# Patient Record
Sex: FEMALE | Race: WHITE | NOT HISPANIC OR LATINO | Employment: UNEMPLOYED | URBAN - METROPOLITAN AREA
[De-identification: names, ages, dates, MRNs, and addresses within clinical notes are randomized per-mention and may not be internally consistent; named-entity substitution may affect disease eponyms.]

---

## 2023-02-23 PROBLEM — E88.819 INSULIN RESISTANCE: Status: ACTIVE | Noted: 2023-02-23

## 2023-02-23 PROBLEM — B85.0 HEAD LICE: Status: ACTIVE | Noted: 2023-02-23

## 2023-02-23 PROBLEM — J30.9 ALLERGIC RHINITIS: Status: ACTIVE | Noted: 2023-02-23

## 2023-02-23 PROBLEM — F32.A DEPRESSION: Status: ACTIVE | Noted: 2023-02-23

## 2023-02-23 PROBLEM — E78.1 HIGH TRIGLYCERIDES: Status: ACTIVE | Noted: 2023-02-23

## 2023-02-23 PROBLEM — F41.1 GENERALIZED ANXIETY DISORDER: Status: ACTIVE | Noted: 2023-02-23

## 2023-02-23 PROBLEM — E55.9 VITAMIN D DEFICIENCY: Status: ACTIVE | Noted: 2023-02-23

## 2023-02-23 RX ORDER — KETOCONAZOLE 20 MG/ML
SHAMPOO, SUSPENSION TOPICAL
COMMUNITY
Start: 2021-10-26

## 2023-02-23 RX ORDER — CETIRIZINE HYDROCHLORIDE 10 MG/1
1 TABLET ORAL NIGHTLY
COMMUNITY
Start: 2020-02-06 | End: 2023-06-13 | Stop reason: SDUPTHER

## 2023-02-23 RX ORDER — BENZOYL PEROXIDE 50 MG/ML
LIQUID TOPICAL
COMMUNITY
Start: 2020-09-15 | End: 2023-03-13 | Stop reason: ALTCHOICE

## 2023-02-23 RX ORDER — ADAPALENE 1 MG/G
GEL TOPICAL
COMMUNITY
Start: 2020-02-06 | End: 2023-06-13 | Stop reason: ALTCHOICE

## 2023-02-23 RX ORDER — SPIRONOLACTONE 50 MG/1
1 TABLET, FILM COATED ORAL DAILY
COMMUNITY
Start: 2022-05-04

## 2023-02-23 RX ORDER — FLUOROURACIL 50 MG/G
CREAM TOPICAL
COMMUNITY
Start: 2020-11-10

## 2023-02-23 RX ORDER — TAZAROTENE 0.45 MG/G
LOTION TOPICAL
COMMUNITY
Start: 2022-05-04

## 2023-02-23 RX ORDER — PERMETHRIN 0.25 %
SPRAY, NON-AEROSOL (ML) MISCELLANEOUS
COMMUNITY
Start: 2022-04-26 | End: 2023-03-13 | Stop reason: ALTCHOICE

## 2023-02-23 RX ORDER — DROSPIRENONE AND ETHINYL ESTRADIOL 0.02-3(28)
1 KIT ORAL DAILY
COMMUNITY
Start: 2022-05-04

## 2023-02-23 RX ORDER — FENOFIBRATE 145 MG/1
1 TABLET, FILM COATED ORAL DAILY
COMMUNITY
Start: 2022-01-13 | End: 2023-03-13 | Stop reason: ALTCHOICE

## 2023-02-23 RX ORDER — MONTELUKAST SODIUM 10 MG/1
1 TABLET ORAL DAILY
COMMUNITY
Start: 2020-02-06 | End: 2023-05-15

## 2023-02-23 RX ORDER — METFORMIN HYDROCHLORIDE 500 MG/1
250 TABLET ORAL
COMMUNITY
Start: 2020-02-06 | End: 2023-03-13 | Stop reason: SINTOL

## 2023-02-23 RX ORDER — VENLAFAXINE HYDROCHLORIDE 150 MG/1
150 CAPSULE, EXTENDED RELEASE ORAL DAILY
COMMUNITY
End: 2023-05-15

## 2023-03-12 PROBLEM — B85.0 HEAD LICE: Status: RESOLVED | Noted: 2023-02-23 | Resolved: 2023-03-12

## 2023-03-12 NOTE — PROGRESS NOTES
Subjective   Patient ID: Debo Bradley is a 22 y.o. female who presents for visit to establish care and follow up of routine labs.    Reports feeling of hot sweat, chills, vomiting, happening any time of the day.  One time it happened 3 hours after eating but was very nauseated. Mother with DM2. These symptoms have been going on for the past few months.  Has had insulin resistance for the past 5-6 years.  Hasn't reportedly gotten worse.     Diet: Doesn't eat seafood, tries to avoid sugary foods, only about once per day. Using sweetened creamer.  Good amount of fruits and veggies.   Exercise: No regular exercise  Weight: Stable  Water: Drinking at least 1-2 glasses per day  Sleep: Varying sleep, sometimes taking 12 mg melatonin to sleep other times even with melatonin cannot get to sleep. More frequent over the past few months. Getting about 9 hours sleep per night  Social: Single, lives with her mom and sister in a two floor home. Cats, guinea pig, avel.   Professional: Recently in school, babysitting.  BA graduate in criminal justice or sociology.    Review of Systems   Constitutional: Negative.    HENT: Negative.     Eyes: Negative.    Respiratory: Negative.     Cardiovascular: Negative.    Gastrointestinal: Negative.    Endocrine: Negative.    Genitourinary: Negative.    Musculoskeletal: Negative.    Skin: Negative.    Neurological: Negative.    Hematological: Negative.    Psychiatric/Behavioral: Negative.          Current Outpatient Medications   Medication Sig Dispense Refill    adapalene (Differin) 0.1 % gel Differin 0.1 % External Gel   Refills: 0        Start : 6-Feb-2020   Active  15 GM Tube      cetirizine (ZyrTEC) 10 mg tablet Take 1 tablet (10 mg) by mouth once daily at bedtime.      drospirenone-ethinyl estradioL (Karo, Gianvi) 3-0.02 mg tablet Take 1 tablet by mouth once daily.      fluorouracil (Efudex) 5 % cream APPLY TOPICALLY TO THE AFFECTED AREA TWICE DAILY THEN COVER WITH A BANDAID       ketoconazole (NIZOral) 2 % shampoo APPLY TO SCALP IN SHOWER LET SIT FOR 5 MINUTES THEN RINSE ONCE A DAY      montelukast (Singulair) 10 mg tablet Take 1 tablet (10 mg) by mouth once daily.      spironolactone (Aldactone) 50 mg tablet Take 1 tablet (50 mg) by mouth once daily.      tazarotene (Arazlo) 0.045 % lotion APPLY TO FACE 2 TO 3 NIGHTS PER WEEK INCREASING TO NIGHTLY AS TOLERATED      venlafaxine XR (Effexor-XR) 150 mg 24 hr capsule Take 1 capsule (150 mg) by mouth once daily. Do not crush or chew.       No current facility-administered medications for this visit.     Past Surgical History:   Procedure Laterality Date    OTHER SURGICAL HISTORY  07/31/2020    Tonsillectomy with adenoidectomy    OTHER SURGICAL HISTORY  07/31/2020    Foot surgery    OTHER SURGICAL HISTORY  07/31/2020    Ear surgery     Family History   Problem Relation Name Age of Onset    Diabetes Mother      Other (abnormal pap) Mother      Diabetes Maternal Grandmother      Hypertension Maternal Grandmother      Depression Maternal Grandmother      Hyperlipidemia Maternal Grandmother      Other (vertigo) Maternal Grandmother      Dementia Other      Psoriasis Other      Hyperlipidemia Other        Social History     Tobacco Use    Smoking status: Never    Smokeless tobacco: Never   Vaping Use    Vaping status: Never Used     Passive vaping exposure: Yes   Substance Use Topics    Alcohol use: Yes     Comment: once in a blue moon    Drug use: Yes     Types: Marijuana        Objective     Visit Vitals  /86 (BP Location: Left arm, Patient Position: Sitting, BP Cuff Size: Adult)   Pulse 76   Temp 36 °C (96.8 °F) (Temporal)   Resp 14   Ht 1.524 m (5')   Wt 70.8 kg (156 lb)   SpO2 99%   BMI 30.47 kg/m²   Smoking Status Never   BSA 1.73 m²        Physical Exam  HENT:      Head: Normocephalic and atraumatic.      Right Ear: Tympanic membrane and ear canal normal.      Left Ear: Tympanic membrane and ear canal normal.      Nose: Nose normal. No  congestion or rhinorrhea.   Eyes:      Extraocular Movements: Extraocular movements intact.      Conjunctiva/sclera: Conjunctivae normal.      Pupils: Pupils are equal, round, and reactive to light.   Neck:      Vascular: No carotid bruit.   Cardiovascular:      Rate and Rhythm: Normal rate and regular rhythm.      Pulses: Normal pulses.      Heart sounds: Normal heart sounds.   Pulmonary:      Effort: Pulmonary effort is normal.      Breath sounds: Normal breath sounds.   Abdominal:      General: Bowel sounds are normal.      Palpations: Abdomen is soft.   Musculoskeletal:         General: Normal range of motion.      Cervical back: Normal range of motion.   Skin:     General: Skin is warm and dry.   Neurological:      General: No focal deficit present.      Mental Status: She is alert and oriented to person, place, and time.   Psychiatric:         Mood and Affect: Mood normal.         Behavior: Behavior normal.           Assessment/Plan   Problem List Items Addressed This Visit       High triglycerides     Discontinue fenofibrate  Repeat fasting lipid panel today and in 3 months  Advised to quit using coffee substitute creamer          Insulin resistance     Reporting nausea, vomiting ?s/t metformin?  Discontinue metformin, check A1c today and in 3 months          Insomnia - Primary     Trazodone 100mg as needed at bedtime  Melatonin 10mg as needed at bedtime            All pertinent lab work and results were reviewed with patient.     Follow up 3 months or sooner as needed    MELI Rico-CNS

## 2023-03-13 ENCOUNTER — OFFICE VISIT (OUTPATIENT)
Dept: PRIMARY CARE | Facility: CLINIC | Age: 23
End: 2023-03-13
Payer: MEDICAID

## 2023-03-13 ENCOUNTER — LAB (OUTPATIENT)
Dept: LAB | Facility: LAB | Age: 23
End: 2023-03-13
Payer: MEDICAID

## 2023-03-13 VITALS
OXYGEN SATURATION: 99 % | BODY MASS INDEX: 30.63 KG/M2 | RESPIRATION RATE: 14 BRPM | HEART RATE: 76 BPM | WEIGHT: 156 LBS | DIASTOLIC BLOOD PRESSURE: 86 MMHG | SYSTOLIC BLOOD PRESSURE: 113 MMHG | HEIGHT: 60 IN | TEMPERATURE: 96.8 F

## 2023-03-13 DIAGNOSIS — E88.819 INSULIN RESISTANCE: ICD-10-CM

## 2023-03-13 DIAGNOSIS — Z11.4 SCREENING FOR HIV (HUMAN IMMUNODEFICIENCY VIRUS): ICD-10-CM

## 2023-03-13 DIAGNOSIS — E55.9 VITAMIN D DEFICIENCY: ICD-10-CM

## 2023-03-13 DIAGNOSIS — E78.1 HIGH TRIGLYCERIDES: ICD-10-CM

## 2023-03-13 DIAGNOSIS — G47.00 INSOMNIA, UNSPECIFIED TYPE: Primary | ICD-10-CM

## 2023-03-13 DIAGNOSIS — Z11.59 ENCOUNTER FOR HEPATITIS C SCREENING TEST FOR LOW RISK PATIENT: ICD-10-CM

## 2023-03-13 LAB
CALCIDIOL (25 OH VITAMIN D3) (NG/ML) IN SER/PLAS: 42 NG/ML
CHOLESTEROL (MG/DL) IN SER/PLAS: 177 MG/DL (ref 0–199)
CHOLESTEROL IN HDL (MG/DL) IN SER/PLAS: 68.6 MG/DL
CHOLESTEROL/HDL RATIO: 2.6
ERYTHROCYTE DISTRIBUTION WIDTH (RATIO) BY AUTOMATED COUNT: 12.7 % (ref 11.5–14.5)
ERYTHROCYTE MEAN CORPUSCULAR HEMOGLOBIN CONCENTRATION (G/DL) BY AUTOMATED: 33 G/DL (ref 32–36)
ERYTHROCYTE MEAN CORPUSCULAR VOLUME (FL) BY AUTOMATED COUNT: 90 FL (ref 80–100)
ERYTHROCYTES (10*6/UL) IN BLOOD BY AUTOMATED COUNT: 4.5 X10E12/L (ref 4–5.2)
ESTIMATED AVERAGE GLUCOSE FOR HBA1C: 103 MG/DL
HEMATOCRIT (%) IN BLOOD BY AUTOMATED COUNT: 40.3 % (ref 36–46)
HEMOGLOBIN (G/DL) IN BLOOD: 13.3 G/DL (ref 12–16)
HEMOGLOBIN A1C/HEMOGLOBIN TOTAL IN BLOOD: 5.2 %
HEPATITIS C VIRUS AB PRESENCE IN SERUM: NONREACTIVE
LDL: 86 MG/DL (ref 0–119)
LEUKOCYTES (10*3/UL) IN BLOOD BY AUTOMATED COUNT: 5.1 X10E9/L (ref 4.4–11.3)
PLATELETS (10*3/UL) IN BLOOD AUTOMATED COUNT: 367 X10E9/L (ref 150–450)
TRIGLYCERIDE (MG/DL) IN SER/PLAS: 113 MG/DL (ref 0–149)
VLDL: 23 MG/DL (ref 0–40)

## 2023-03-13 PROCEDURE — 82306 VITAMIN D 25 HYDROXY: CPT

## 2023-03-13 PROCEDURE — 80061 LIPID PANEL: CPT

## 2023-03-13 PROCEDURE — 99214 OFFICE O/P EST MOD 30 MIN: CPT

## 2023-03-13 PROCEDURE — 85027 COMPLETE CBC AUTOMATED: CPT

## 2023-03-13 PROCEDURE — 83036 HEMOGLOBIN GLYCOSYLATED A1C: CPT

## 2023-03-13 PROCEDURE — 86803 HEPATITIS C AB TEST: CPT

## 2023-03-13 PROCEDURE — 87389 HIV-1 AG W/HIV-1&-2 AB AG IA: CPT

## 2023-03-13 PROCEDURE — 36415 COLL VENOUS BLD VENIPUNCTURE: CPT

## 2023-03-13 PROCEDURE — 1036F TOBACCO NON-USER: CPT

## 2023-03-13 RX ORDER — TRAZODONE HYDROCHLORIDE 100 MG/1
100 TABLET ORAL NIGHTLY PRN
Qty: 30 TABLET | Refills: 0 | Status: SHIPPED | OUTPATIENT
Start: 2023-03-13 | End: 2023-12-13 | Stop reason: SDUPTHER

## 2023-03-13 SDOH — ECONOMIC STABILITY: FOOD INSECURITY: WITHIN THE PAST 12 MONTHS, YOU WORRIED THAT YOUR FOOD WOULD RUN OUT BEFORE YOU GOT MONEY TO BUY MORE.: NEVER TRUE

## 2023-03-13 SDOH — ECONOMIC STABILITY: FOOD INSECURITY: WITHIN THE PAST 12 MONTHS, THE FOOD YOU BOUGHT JUST DIDN'T LAST AND YOU DIDN'T HAVE MONEY TO GET MORE.: NEVER TRUE

## 2023-03-13 ASSESSMENT — ENCOUNTER SYMPTOMS
NEUROLOGICAL NEGATIVE: 1
PSYCHIATRIC NEGATIVE: 1
ENDOCRINE NEGATIVE: 1
RESPIRATORY NEGATIVE: 1
GASTROINTESTINAL NEGATIVE: 1
EYES NEGATIVE: 1
HEMATOLOGIC/LYMPHATIC NEGATIVE: 1
CONSTITUTIONAL NEGATIVE: 1
MUSCULOSKELETAL NEGATIVE: 1
CARDIOVASCULAR NEGATIVE: 1

## 2023-03-13 ASSESSMENT — LIFESTYLE VARIABLES
HOW MANY STANDARD DRINKS CONTAINING ALCOHOL DO YOU HAVE ON A TYPICAL DAY: 1 OR 2
HOW OFTEN DO YOU HAVE SIX OR MORE DRINKS ON ONE OCCASION: NEVER
SKIP TO QUESTIONS 9-10: 1
AUDIT-C TOTAL SCORE: 1
HOW OFTEN DO YOU HAVE A DRINK CONTAINING ALCOHOL: MONTHLY OR LESS

## 2023-03-13 ASSESSMENT — PATIENT HEALTH QUESTIONNAIRE - PHQ9
1. LITTLE INTEREST OR PLEASURE IN DOING THINGS: NOT AT ALL
SUM OF ALL RESPONSES TO PHQ9 QUESTIONS 1 & 2: 0
2. FEELING DOWN, DEPRESSED OR HOPELESS: NOT AT ALL

## 2023-03-13 ASSESSMENT — PAIN SCALES - GENERAL: PAINLEVEL: 0-NO PAIN

## 2023-03-13 NOTE — ASSESSMENT & PLAN NOTE
Reporting nausea, vomiting ?s/t metformin?  Discontinue metformin, check A1c today and in 3 months

## 2023-03-13 NOTE — ASSESSMENT & PLAN NOTE
Discontinue fenofibrate  Repeat fasting lipid panel today and in 3 months  Advised to quit using coffee substitute creamer

## 2023-03-13 NOTE — PATIENT INSTRUCTIONS
Thank you for coming to see me today.  If you have any questions or concerns following our visit, please contact the office.  Phone: [phone]    Follow up with me in 3 months or sooner as needed    1)  Increase water intake- goal 6-8 glasses per day    2) Increase exercise- try to walk 20 minutes 2-3 times per day.  AHA recommends 150 minutes of moderate intensity exercise per week    3) Stop coffee mate creamer    4) Get fasting labs today    5) STOP metformin and fenofibrate

## 2023-03-14 LAB — HIV 1/ 2 AG/AB SCREEN: NONREACTIVE

## 2023-03-17 ENCOUNTER — TELEPHONE (OUTPATIENT)
Dept: PRIMARY CARE | Facility: CLINIC | Age: 23
End: 2023-03-17
Payer: MEDICAID

## 2023-05-03 DIAGNOSIS — E78.1 HIGH TRIGLYCERIDES: Primary | ICD-10-CM

## 2023-05-03 RX ORDER — FENOFIBRATE 145 MG/1
TABLET, FILM COATED ORAL
Qty: 30 TABLET | Refills: 1 | Status: SHIPPED | OUTPATIENT
Start: 2023-05-03 | End: 2023-06-13 | Stop reason: ALTCHOICE

## 2023-05-03 RX ORDER — FENOFIBRATE 145 MG/1
145 TABLET, FILM COATED ORAL DAILY
COMMUNITY
Start: 2023-04-07 | End: 2023-05-03 | Stop reason: SDUPTHER

## 2023-05-03 RX ORDER — METFORMIN HYDROCHLORIDE 500 MG/1
250 TABLET ORAL 2 TIMES DAILY
COMMUNITY
Start: 2023-04-07 | End: 2023-06-13 | Stop reason: ALTCHOICE

## 2023-05-10 DIAGNOSIS — J30.9 ALLERGIC RHINITIS, UNSPECIFIED SEASONALITY, UNSPECIFIED TRIGGER: ICD-10-CM

## 2023-05-10 DIAGNOSIS — F41.1 GENERALIZED ANXIETY DISORDER: Primary | ICD-10-CM

## 2023-05-15 RX ORDER — VENLAFAXINE HYDROCHLORIDE 150 MG/1
CAPSULE, EXTENDED RELEASE ORAL
Qty: 30 CAPSULE | Refills: 11 | Status: SHIPPED | OUTPATIENT
Start: 2023-05-15 | End: 2023-06-13 | Stop reason: SDUPTHER

## 2023-05-15 RX ORDER — MONTELUKAST SODIUM 10 MG/1
TABLET ORAL
Qty: 30 TABLET | Refills: 11 | Status: SHIPPED | OUTPATIENT
Start: 2023-05-15 | End: 2023-06-13 | Stop reason: SDUPTHER

## 2023-05-15 RX ORDER — METFORMIN HYDROCHLORIDE 500 MG/1
TABLET ORAL
Qty: 30 TABLET | Refills: 0 | OUTPATIENT
Start: 2023-05-15

## 2023-06-13 ENCOUNTER — LAB (OUTPATIENT)
Dept: LAB | Facility: LAB | Age: 23
End: 2023-06-13
Payer: MEDICAID

## 2023-06-13 ENCOUNTER — OFFICE VISIT (OUTPATIENT)
Dept: PRIMARY CARE | Facility: CLINIC | Age: 23
End: 2023-06-13
Payer: MEDICAID

## 2023-06-13 VITALS
WEIGHT: 159 LBS | DIASTOLIC BLOOD PRESSURE: 89 MMHG | HEART RATE: 74 BPM | OXYGEN SATURATION: 99 % | RESPIRATION RATE: 16 BRPM | BODY MASS INDEX: 31.22 KG/M2 | TEMPERATURE: 96.8 F | HEIGHT: 60 IN | SYSTOLIC BLOOD PRESSURE: 127 MMHG

## 2023-06-13 DIAGNOSIS — F41.8 MIXED ANXIETY AND DEPRESSIVE DISORDER: ICD-10-CM

## 2023-06-13 DIAGNOSIS — E78.1 HIGH TRIGLYCERIDES: ICD-10-CM

## 2023-06-13 DIAGNOSIS — G47.00 INSOMNIA, UNSPECIFIED TYPE: ICD-10-CM

## 2023-06-13 DIAGNOSIS — F41.1 GENERALIZED ANXIETY DISORDER: ICD-10-CM

## 2023-06-13 DIAGNOSIS — J30.9 ALLERGIC RHINITIS, UNSPECIFIED SEASONALITY, UNSPECIFIED TRIGGER: Primary | ICD-10-CM

## 2023-06-13 PROBLEM — E88.819 INSULIN RESISTANCE: Status: RESOLVED | Noted: 2023-02-23 | Resolved: 2023-06-13

## 2023-06-13 PROBLEM — F32.A DEPRESSION: Status: RESOLVED | Noted: 2023-02-23 | Resolved: 2023-06-13

## 2023-06-13 PROBLEM — L70.0 ACNE VULGARIS: Status: ACTIVE | Noted: 2023-06-13

## 2023-06-13 LAB
CHOLESTEROL (MG/DL) IN SER/PLAS: 187 MG/DL (ref 0–199)
CHOLESTEROL IN HDL (MG/DL) IN SER/PLAS: 56.7 MG/DL
CHOLESTEROL/HDL RATIO: 3.3
LDL: 77 MG/DL (ref 0–119)
NON HDL CHOLESTEROL: 130 MG/DL (ref 0–149)
TRIGLYCERIDE (MG/DL) IN SER/PLAS: 266 MG/DL (ref 0–149)
VLDL: 53 MG/DL (ref 0–40)

## 2023-06-13 PROCEDURE — 99213 OFFICE O/P EST LOW 20 MIN: CPT

## 2023-06-13 PROCEDURE — 80061 LIPID PANEL: CPT

## 2023-06-13 PROCEDURE — 36415 COLL VENOUS BLD VENIPUNCTURE: CPT

## 2023-06-13 PROCEDURE — 1036F TOBACCO NON-USER: CPT

## 2023-06-13 RX ORDER — SPIRONOLACTONE 50 MG/1
50 TABLET, FILM COATED ORAL DAILY
Qty: 90 TABLET | Refills: 3 | Status: CANCELLED | OUTPATIENT
Start: 2023-06-13

## 2023-06-13 RX ORDER — CETIRIZINE HYDROCHLORIDE 10 MG/1
10 TABLET ORAL NIGHTLY
Qty: 90 TABLET | Refills: 3 | Status: SHIPPED | OUTPATIENT
Start: 2023-06-13 | End: 2024-05-13 | Stop reason: SDUPTHER

## 2023-06-13 RX ORDER — VENLAFAXINE HYDROCHLORIDE 150 MG/1
150 CAPSULE, EXTENDED RELEASE ORAL DAILY
Qty: 90 CAPSULE | Refills: 3 | Status: SHIPPED | OUTPATIENT
Start: 2023-06-13 | End: 2024-05-13 | Stop reason: SDUPTHER

## 2023-06-13 RX ORDER — OMEGA-3-ACID ETHYL ESTERS 1 G/1
2 CAPSULE, LIQUID FILLED ORAL 2 TIMES DAILY
Qty: 120 CAPSULE | Refills: 11 | Status: SHIPPED | OUTPATIENT
Start: 2023-06-13 | End: 2024-06-12

## 2023-06-13 RX ORDER — MONTELUKAST SODIUM 10 MG/1
10 TABLET ORAL DAILY
Qty: 90 TABLET | Refills: 3 | Status: SHIPPED | OUTPATIENT
Start: 2023-06-13 | End: 2024-05-13 | Stop reason: SDUPTHER

## 2023-06-13 ASSESSMENT — ENCOUNTER SYMPTOMS
ENDOCRINE NEGATIVE: 1
NEUROLOGICAL NEGATIVE: 1
CARDIOVASCULAR NEGATIVE: 1
MUSCULOSKELETAL NEGATIVE: 1
RESPIRATORY NEGATIVE: 1
EYES NEGATIVE: 1
CONSTITUTIONAL NEGATIVE: 1
HEMATOLOGIC/LYMPHATIC NEGATIVE: 1
GASTROINTESTINAL NEGATIVE: 1
PSYCHIATRIC NEGATIVE: 1

## 2023-06-13 ASSESSMENT — PAIN SCALES - GENERAL: PAINLEVEL: 0-NO PAIN

## 2023-06-13 NOTE — PROGRESS NOTES
Subjective   Patient ID: Debo Bradley is a 23 y.o. female who presents for follow up of stomach pain.    Trazodone started at last visit helping with sleep, getting too much sleep with 100 mg, has decrease to 50mg and getting good 8-9 hours of sleep. Not feeling groggy the next day. Will take if if she can't sleep by 1-2AM which helps with sleep.     Stomach aches gone since stopping metformin.   Has discontinued artificial coffee creamer.     Diet: Doesn't eat seafood, tries to avoid sugary foods, only about once per day. Using sweetened creamer.  Good amount of fruits and veggies.   Exercise: No regular exercise  Weight: Stable  Water: Drinking at least 1-2 glasses per day  Sleep: Much improved with addition of trazodone for sleep  Social: Single, lives with her mom and sister in a two floor home. Cats, guinea pig, avel.   Professional: Recently in school, babysitting.  BA graduate in criminal justice or sociology.    Review of Systems   Constitutional: Negative.    HENT: Negative.     Eyes: Negative.    Respiratory: Negative.     Cardiovascular: Negative.    Gastrointestinal: Negative.    Endocrine: Negative.    Genitourinary: Negative.    Musculoskeletal: Negative.    Skin: Negative.    Neurological: Negative.    Hematological: Negative.    Psychiatric/Behavioral: Negative.          Current Outpatient Medications   Medication Sig Dispense Refill    drospirenone-ethinyl estradioL (Karo, Gianvi) 3-0.02 mg tablet Take 1 tablet by mouth once daily.      fluorouracil (Efudex) 5 % cream APPLY TOPICALLY TO THE AFFECTED AREA TWICE DAILY THEN COVER WITH A BANDAID      ketoconazole (NIZOral) 2 % shampoo APPLY TO SCALP IN SHOWER LET SIT FOR 5 MINUTES THEN RINSE ONCE A DAY      spironolactone (Aldactone) 50 mg tablet Take 1 tablet (50 mg) by mouth once daily.      tazarotene (Arazlo) 0.045 % lotion APPLY TO FACE 2 TO 3 NIGHTS PER WEEK INCREASING TO NIGHTLY AS TOLERATED      traZODone (Desyrel) 100 mg tablet Take 1  tablet (100 mg) by mouth as needed at bedtime for sleep. 30 tablet 0    cetirizine (ZyrTEC) 10 mg tablet Take 1 tablet (10 mg) by mouth once daily at bedtime. 90 tablet 3    montelukast (Singulair) 10 mg tablet Take 1 tablet (10 mg) by mouth once daily. 90 tablet 3    venlafaxine XR (Effexor-XR) 150 mg 24 hr capsule Take 1 capsule (150 mg) by mouth once daily. Do not crush or chew. 90 capsule 3     No current facility-administered medications for this visit.     Past Surgical History:   Procedure Laterality Date    ADENOIDECTOMY      OTHER SURGICAL HISTORY  07/31/2020    Tonsillectomy with adenoidectomy    OTHER SURGICAL HISTORY  07/31/2020    Foot surgery    OTHER SURGICAL HISTORY  07/31/2020    Ear surgery    TONSILLECTOMY      WISDOM TOOTH EXTRACTION       Family History   Problem Relation Name Age of Onset    Diabetes Mother Lisbeth     Other (abnormal pap) Mother Lisbeth     Diabetes Maternal Grandmother Sandra     Hypertension Maternal Grandmother Sandra     Depression Maternal Grandmother Sandra     Hyperlipidemia Maternal Grandmother Sandra     Other (vertigo) Maternal Grandmother Sandra     Dementia Other      Psoriasis Other      Hyperlipidemia Other        Social History     Tobacco Use    Smoking status: Never    Smokeless tobacco: Never   Vaping Use    Vaping Use: Never used   Substance Use Topics    Alcohol use: Yes     Comment: once in a blue moon    Drug use: Yes     Types: Marijuana        Objective     Visit Vitals  /89 (BP Location: Left arm, Patient Position: Sitting, BP Cuff Size: Adult)   Pulse 74   Temp 36 °C (96.8 °F) (Temporal)   Resp 16   Ht 1.524 m (5')   Wt 72.1 kg (159 lb)   SpO2 99%   BMI 31.05 kg/m²   Smoking Status Never   BSA 1.75 m²        Physical Exam  Constitutional:       Appearance: Normal appearance.   HENT:      Head: Normocephalic and atraumatic.   Eyes:      Extraocular Movements: Extraocular movements intact.      Pupils: Pupils are equal, round, and reactive to light.    Cardiovascular:      Rate and Rhythm: Normal rate and regular rhythm.   Pulmonary:      Effort: Pulmonary effort is normal.      Breath sounds: Normal breath sounds.   Abdominal:      General: Abdomen is flat. Bowel sounds are normal.      Palpations: Abdomen is soft.   Musculoskeletal:         General: Normal range of motion.   Neurological:      General: No focal deficit present.      Mental Status: She is alert and oriented to person, place, and time.   Psychiatric:         Mood and Affect: Mood normal.         Behavior: Behavior normal.           Assessment/Plan   Problem List Items Addressed This Visit       Allergic rhinitis - Primary    Relevant Medications    cetirizine (ZyrTEC) 10 mg tablet    montelukast (Singulair) 10 mg tablet    Generalized anxiety disorder    Relevant Medications    venlafaxine XR (Effexor-XR) 150 mg 24 hr capsule    High triglycerides     Fasting lipid panel from 3/2023 with all values WNL.   Encouraged to refrain from artificial coffee creamers and adhere to healthy diet    Repeat fasting lipid panel today and again prior to next visit         Relevant Orders    Lipid Panel    Follow Up In Primary Care    Insomnia     Much improved with addition of trazodone 50mg PRN bedtime  Continue current medication              All pertinent lab work and results were reviewed with patient.     Follow up with 6 months with labs prior to visit    MELI Rico-CNS

## 2023-06-13 NOTE — ASSESSMENT & PLAN NOTE
Fasting lipid panel from 3/2023 with all values WNL. Fenofibrate discontinued after.  Has improved diet and cut out artificial creamer.     Repeat fasting lipid panel today and again prior to next visit  Continue healthy diet

## 2023-06-13 NOTE — PATIENT INSTRUCTIONS
Thank you for coming to see me today.  If you have any questions or concerns following our visit, please contact the office.  Phone: [phone]    Follow up with me in 6 months or sooner as needed    1)  Get fasting labwork today.  The lab is down the [address] from our office.

## 2023-09-12 DIAGNOSIS — F41.1 GENERALIZED ANXIETY DISORDER: ICD-10-CM

## 2023-09-12 RX ORDER — BUSPIRONE HYDROCHLORIDE 5 MG/1
5 TABLET ORAL 2 TIMES DAILY
Qty: 60 TABLET | Refills: 3 | Status: SHIPPED | OUTPATIENT
Start: 2023-09-12 | End: 2023-12-13 | Stop reason: SDUPTHER

## 2023-12-05 ENCOUNTER — LAB (OUTPATIENT)
Dept: LAB | Facility: LAB | Age: 23
End: 2023-12-05
Payer: MEDICAID

## 2023-12-05 DIAGNOSIS — Z13.29 SCREENING FOR THYROID DISORDER: ICD-10-CM

## 2023-12-05 DIAGNOSIS — Z13.0 SCREENING FOR DEFICIENCY ANEMIA: Primary | ICD-10-CM

## 2023-12-05 DIAGNOSIS — Z13.220 SCREENING FOR HYPERLIPIDEMIA: ICD-10-CM

## 2023-12-05 DIAGNOSIS — Z13.89 SCREENING FOR NEPHROPATHY: ICD-10-CM

## 2023-12-05 DIAGNOSIS — Z13.0 SCREENING FOR DEFICIENCY ANEMIA: ICD-10-CM

## 2023-12-05 LAB
25(OH)D3 SERPL-MCNC: 36 NG/ML (ref 30–100)
ALBUMIN SERPL BCP-MCNC: 3.9 G/DL (ref 3.4–5)
ALP SERPL-CCNC: 42 U/L (ref 33–110)
ALT SERPL W P-5'-P-CCNC: 10 U/L (ref 7–45)
ANION GAP SERPL CALC-SCNC: 11 MMOL/L (ref 10–20)
AST SERPL W P-5'-P-CCNC: 11 U/L (ref 9–39)
BILIRUB SERPL-MCNC: 0.4 MG/DL (ref 0–1.2)
BUN SERPL-MCNC: 12 MG/DL (ref 6–23)
CALCIUM SERPL-MCNC: 9.4 MG/DL (ref 8.6–10.3)
CHLORIDE SERPL-SCNC: 107 MMOL/L (ref 98–107)
CHOLEST SERPL-MCNC: 199 MG/DL (ref 0–199)
CHOLESTEROL/HDL RATIO: 3.4
CO2 SERPL-SCNC: 25 MMOL/L (ref 21–32)
CREAT SERPL-MCNC: 0.82 MG/DL (ref 0.5–1.05)
ERYTHROCYTE [DISTWIDTH] IN BLOOD BY AUTOMATED COUNT: 12.5 % (ref 11.5–14.5)
GFR SERPL CREATININE-BSD FRML MDRD: >90 ML/MIN/1.73M*2
GLUCOSE SERPL-MCNC: 88 MG/DL (ref 74–99)
HCT VFR BLD AUTO: 43.3 % (ref 36–46)
HDLC SERPL-MCNC: 59.1 MG/DL
HGB BLD-MCNC: 14.5 G/DL (ref 12–16)
LDLC SERPL CALC-MCNC: 95 MG/DL
MCH RBC QN AUTO: 30.4 PG (ref 26–34)
MCHC RBC AUTO-ENTMCNC: 33.5 G/DL (ref 32–36)
MCV RBC AUTO: 91 FL (ref 80–100)
NON HDL CHOLESTEROL: 140 MG/DL (ref 0–149)
NRBC BLD-RTO: 0 /100 WBCS (ref 0–0)
PLATELET # BLD AUTO: 296 X10*3/UL (ref 150–450)
POTASSIUM SERPL-SCNC: 4 MMOL/L (ref 3.5–5.3)
PROT SERPL-MCNC: 6.6 G/DL (ref 6.4–8.2)
RBC # BLD AUTO: 4.77 X10*6/UL (ref 4–5.2)
SODIUM SERPL-SCNC: 139 MMOL/L (ref 136–145)
TRIGL SERPL-MCNC: 225 MG/DL (ref 0–149)
TSH SERPL-ACNC: 1.87 MIU/L (ref 0.44–3.98)
VLDL: 45 MG/DL (ref 0–40)
WBC # BLD AUTO: 6.6 X10*3/UL (ref 4.4–11.3)

## 2023-12-05 PROCEDURE — 80061 LIPID PANEL: CPT

## 2023-12-05 PROCEDURE — 84443 ASSAY THYROID STIM HORMONE: CPT

## 2023-12-05 PROCEDURE — 82306 VITAMIN D 25 HYDROXY: CPT

## 2023-12-05 PROCEDURE — 85027 COMPLETE CBC AUTOMATED: CPT

## 2023-12-05 PROCEDURE — 80053 COMPREHEN METABOLIC PANEL: CPT

## 2023-12-05 PROCEDURE — 36415 COLL VENOUS BLD VENIPUNCTURE: CPT

## 2023-12-13 ENCOUNTER — OFFICE VISIT (OUTPATIENT)
Dept: PRIMARY CARE | Facility: CLINIC | Age: 23
End: 2023-12-13
Payer: MEDICAID

## 2023-12-13 VITALS
RESPIRATION RATE: 14 BRPM | HEART RATE: 72 BPM | WEIGHT: 160 LBS | HEIGHT: 60 IN | TEMPERATURE: 97.5 F | BODY MASS INDEX: 31.41 KG/M2 | DIASTOLIC BLOOD PRESSURE: 88 MMHG | SYSTOLIC BLOOD PRESSURE: 119 MMHG | OXYGEN SATURATION: 99 %

## 2023-12-13 DIAGNOSIS — E55.9 VITAMIN D DEFICIENCY: ICD-10-CM

## 2023-12-13 DIAGNOSIS — F41.1 GENERALIZED ANXIETY DISORDER: ICD-10-CM

## 2023-12-13 DIAGNOSIS — E78.1 HIGH TRIGLYCERIDES: ICD-10-CM

## 2023-12-13 DIAGNOSIS — B37.31 VAGINAL YEAST INFECTION: ICD-10-CM

## 2023-12-13 DIAGNOSIS — L72.0 EPIDERMOID CYST OF SKIN OF EAR: ICD-10-CM

## 2023-12-13 DIAGNOSIS — F41.8 MIXED ANXIETY AND DEPRESSIVE DISORDER: ICD-10-CM

## 2023-12-13 DIAGNOSIS — Z23 NEED FOR INFLUENZA VACCINATION: Primary | ICD-10-CM

## 2023-12-13 DIAGNOSIS — G47.00 INSOMNIA, UNSPECIFIED TYPE: ICD-10-CM

## 2023-12-13 PROBLEM — G89.29 CHRONIC RIGHT SI JOINT PAIN: Status: ACTIVE | Noted: 2018-01-09

## 2023-12-13 PROBLEM — M53.3 CHRONIC RIGHT SI JOINT PAIN: Status: ACTIVE | Noted: 2018-01-09

## 2023-12-13 PROBLEM — L40.9 PSORIASIS: Status: ACTIVE | Noted: 2023-12-13

## 2023-12-13 PROBLEM — M99.02 THORACIC SEGMENT DYSFUNCTION: Status: ACTIVE | Noted: 2018-01-09

## 2023-12-13 PROBLEM — L25.9 CONTACT DERMATITIS: Status: RESOLVED | Noted: 2023-12-13 | Resolved: 2023-12-13

## 2023-12-13 PROBLEM — L21.9 SEBORRHEIC DERMATITIS: Status: ACTIVE | Noted: 2023-12-13

## 2023-12-13 PROBLEM — M99.01 CERVICAL SEGMENT DYSFUNCTION: Status: ACTIVE | Noted: 2018-01-09

## 2023-12-13 PROBLEM — M47.812 CERVICAL SPONDYLOSIS WITHOUT MYELOPATHY: Status: ACTIVE | Noted: 2018-01-09

## 2023-12-13 PROBLEM — M47.816 SPONDYLOSIS OF LUMBAR REGION WITHOUT MYELOPATHY OR RADICULOPATHY: Status: ACTIVE | Noted: 2018-01-09

## 2023-12-13 PROBLEM — M99.03 LUMBAR REGION SOMATIC DYSFUNCTION: Status: ACTIVE | Noted: 2018-01-09

## 2023-12-13 PROCEDURE — 90686 IIV4 VACC NO PRSV 0.5 ML IM: CPT

## 2023-12-13 PROCEDURE — 90471 IMMUNIZATION ADMIN: CPT

## 2023-12-13 PROCEDURE — 1036F TOBACCO NON-USER: CPT

## 2023-12-13 PROCEDURE — 99213 OFFICE O/P EST LOW 20 MIN: CPT

## 2023-12-13 RX ORDER — ERGOCALCIFEROL 1.25 MG/1
50000 CAPSULE ORAL 2 TIMES WEEKLY
Qty: 24 CAPSULE | Refills: 1 | Status: SHIPPED | OUTPATIENT
Start: 2023-12-14 | End: 2024-05-05

## 2023-12-13 RX ORDER — FLUCONAZOLE 150 MG/1
150 TABLET ORAL ONCE
Qty: 1 TABLET | Refills: 0 | Status: SHIPPED | OUTPATIENT
Start: 2023-12-13 | End: 2023-12-13

## 2023-12-13 RX ORDER — ADAPALENE GEL USP, 0.3% 3 MG/G
GEL TOPICAL NIGHTLY
COMMUNITY
Start: 2018-01-16

## 2023-12-13 RX ORDER — CLINDAMYCIN HYDROCHLORIDE 150 MG/1
450 CAPSULE ORAL 3 TIMES DAILY
Qty: 63 CAPSULE | Refills: 0 | Status: SHIPPED | OUTPATIENT
Start: 2023-12-13 | End: 2023-12-20

## 2023-12-13 RX ORDER — TRAZODONE HYDROCHLORIDE 50 MG/1
50 TABLET ORAL NIGHTLY PRN
Qty: 30 TABLET | Refills: 11 | Status: SHIPPED | OUTPATIENT
Start: 2023-12-13 | End: 2024-12-12

## 2023-12-13 RX ORDER — BUSPIRONE HYDROCHLORIDE 10 MG/1
10 TABLET ORAL 3 TIMES DAILY
Qty: 90 TABLET | Refills: 5 | Status: SHIPPED | OUTPATIENT
Start: 2023-12-13 | End: 2024-05-13 | Stop reason: SDUPTHER

## 2023-12-13 RX ORDER — OMEGA-3-ACID ETHYL ESTERS 1 G/1
1 CAPSULE, LIQUID FILLED ORAL 2 TIMES DAILY
Qty: 60 CAPSULE | Refills: 11 | Status: SHIPPED | OUTPATIENT
Start: 2023-12-13 | End: 2024-12-12

## 2023-12-13 SDOH — ECONOMIC STABILITY: FOOD INSECURITY: WITHIN THE PAST 12 MONTHS, THE FOOD YOU BOUGHT JUST DIDN'T LAST AND YOU DIDN'T HAVE MONEY TO GET MORE.: NEVER TRUE

## 2023-12-13 SDOH — ECONOMIC STABILITY: FOOD INSECURITY: WITHIN THE PAST 12 MONTHS, YOU WORRIED THAT YOUR FOOD WOULD RUN OUT BEFORE YOU GOT MONEY TO BUY MORE.: NEVER TRUE

## 2023-12-13 ASSESSMENT — ENCOUNTER SYMPTOMS
CARDIOVASCULAR NEGATIVE: 1
HEMATOLOGIC/LYMPHATIC NEGATIVE: 1
ENDOCRINE NEGATIVE: 1
GASTROINTESTINAL NEGATIVE: 1
PSYCHIATRIC NEGATIVE: 1
RESPIRATORY NEGATIVE: 1
EYES NEGATIVE: 1
NEUROLOGICAL NEGATIVE: 1
CONSTITUTIONAL NEGATIVE: 1
MUSCULOSKELETAL NEGATIVE: 1

## 2023-12-13 ASSESSMENT — PATIENT HEALTH QUESTIONNAIRE - PHQ9
1. LITTLE INTEREST OR PLEASURE IN DOING THINGS: NOT AT ALL
2. FEELING DOWN, DEPRESSED OR HOPELESS: NOT AT ALL
SUM OF ALL RESPONSES TO PHQ9 QUESTIONS 1 & 2: 0

## 2023-12-13 ASSESSMENT — PAIN SCALES - GENERAL: PAINLEVEL: 4

## 2023-12-13 NOTE — PATIENT INSTRUCTIONS
Thank you for coming to see me today.  If you have any questions or concerns following our visit, please contact the office.  Phone: [phone]    Follow up with me in 4-6 months    1)  START Clindamycin 450mg three times daily for 7 days to clear cyst behind your ear.  Would recommend taking probiotic 2 hours after last dose of the day to prevent GI upset and diarrhea, Can use one time diflucan as needed for vaginal yeast infection    2) INCREASE buspirone to 10mg 2-3 times daily as needed for acute or subacute anxiety    3) START omega 3 fatty acid 1000mg twice daily    4) START ergocalciferol 84121 units twice weekly    5) Get fasting labwork a few days prior to next visit.  The lab is down the [address] from our office.

## 2023-12-13 NOTE — ASSESSMENT & PLAN NOTE
Anxiety relatively controlled, tried buspirone 5mg twice daily     Continue venlafaxine 150mg daily, increase buspirone to 10mg 2-3 times daily as needed for acute anxiety. If ineffective can try hydroxazine.

## 2023-12-13 NOTE — PROGRESS NOTES
Subjective   Patient ID: Debo Bradley is a 23 y.o. female who presents for follow up of anxiety/insomnia and painful bump behind her ear.    Diet: Doesn't eat seafood, tries to avoid sugary foods, only about once per day. Has since stopped using artifical creamer in coffee. Good amount of fruits and veggies.   Exercise: No regular exercise  Weight: Stable  Water: Drinking at least 1-2 glasses per day  Sleep: Much improved with addition of trazodone for sleep  Social: Single, lives with her mom and sister in a two floor home. Cats, guinea pig, avel.   Professional: Recently in school, [employer].  BA graduate in criminal justice or sociology. Looking for a career    Review of Systems   Constitutional: Negative.    HENT: Negative.     Eyes: Negative.    Respiratory: Negative.     Cardiovascular: Negative.    Gastrointestinal: Negative.    Endocrine: Negative.    Genitourinary: Negative.    Musculoskeletal: Negative.    Skin: Negative.    Neurological: Negative.    Hematological: Negative.    Psychiatric/Behavioral: Negative.          Current Outpatient Medications   Medication Sig Dispense Refill    adapalene (Differin) 0.3 % gel Apply topically once daily at bedtime.      cetirizine (ZyrTEC) 10 mg tablet Take 1 tablet (10 mg) by mouth once daily at bedtime. 90 tablet 3    drospirenone-ethinyl estradioL (Karo, Gianvi) 3-0.02 mg tablet Take 1 tablet by mouth once daily.      fluorouracil (Efudex) 5 % cream APPLY TOPICALLY TO THE AFFECTED AREA TWICE DAILY THEN COVER WITH A BANDAID      ketoconazole (NIZOral) 2 % shampoo APPLY TO SCALP IN SHOWER LET SIT FOR 5 MINUTES THEN RINSE ONCE A DAY      montelukast (Singulair) 10 mg tablet Take 1 tablet (10 mg) by mouth once daily. 90 tablet 3    omega-3 acid ethyl esters (Lovaza) 1 gram capsule Take 2 capsules (2 g) by mouth 2 times a day. 120 capsule 11    spironolactone (Aldactone) 50 mg tablet Take 1 tablet (50 mg) by mouth once daily.      tazarotene (Arazlo) 0.045  % lotion APPLY TO FACE 2 TO 3 NIGHTS PER WEEK INCREASING TO NIGHTLY AS TOLERATED      venlafaxine XR (Effexor-XR) 150 mg 24 hr capsule Take 1 capsule (150 mg) by mouth once daily. Do not crush or chew. 90 capsule 3    busPIRone (Buspar) 10 mg tablet Take 1 tablet (10 mg) by mouth 3 times a day. 90 tablet 5    clindamycin (Cleocin) 150 mg capsule Take 3 capsules (450 mg) by mouth 3 times a day for 7 days. 63 capsule 0    [START ON 12/14/2023] ergocalciferol (Vitamin D-2) 1.25 MG (18165 UT) capsule Take 1 capsule (50,000 Units) by mouth 2 times a week. 24 capsule 1    fluconazole (Diflucan) 150 mg tablet Take 1 tablet (150 mg) by mouth 1 time for 1 dose. 1 tablet 0    omega-3 acid ethyl esters (Lovaza) 1 gram capsule Take 1 capsule (1 g) by mouth 2 times a day. 60 capsule 11    traZODone (Desyrel) 50 mg tablet Take 1 tablet (50 mg) by mouth as needed at bedtime for sleep. 30 tablet 11     No current facility-administered medications for this visit.     Past Surgical History:   Procedure Laterality Date    ADENOIDECTOMY      OTHER SURGICAL HISTORY  07/31/2020    Tonsillectomy with adenoidectomy    OTHER SURGICAL HISTORY  07/31/2020    Foot surgery    OTHER SURGICAL HISTORY  07/31/2020    Ear surgery    TONSILLECTOMY      WISDOM TOOTH EXTRACTION       Family History   Problem Relation Name Age of Onset    Diabetes Mother Lisbeth     Other (abnormal pap) Mother Lisbeth     Diabetes Maternal Grandmother Sandra     Hypertension Maternal Grandmother Sandra     Depression Maternal Grandmother Sandra     Hyperlipidemia Maternal Grandmother Sandra     Other (vertigo) Maternal Grandmother Sandra     Dementia Other      Psoriasis Other      Hyperlipidemia Other        Social History     Tobacco Use    Smoking status: Never    Smokeless tobacco: Never   Vaping Use    Vaping Use: Never used   Substance Use Topics    Alcohol use: Yes     Comment: once in a blue moon    Drug use: Yes     Types: Marijuana        Objective     Visit Vitals  /88  (BP Location: Left arm, Patient Position: Sitting, BP Cuff Size: Large adult)   Pulse 72   Temp 36.4 °C (97.5 °F)   Resp 14   Ht 1.524 m (5')   Wt 72.6 kg (160 lb)   SpO2 99%   BMI 31.25 kg/m²   Smoking Status Never   BSA 1.75 m²        Physical Exam      Assessment/Plan   Problem List Items Addressed This Visit       Mixed anxiety and depressive disorder     Anxiety relatively controlled, tried buspirone 5mg twice daily     Continue venlafaxine 150mg daily, increase buspirone to 10mg 2-3 times daily as needed for acute anxiety. If ineffective can try hydroxazine.         Relevant Medications    busPIRone (Buspar) 10 mg tablet    Other Relevant Orders    Follow Up In Primary Care - Established    High triglycerides    Relevant Medications    omega-3 acid ethyl esters (Lovaza) 1 gram capsule    Other Relevant Orders    Follow Up In Primary Care - Established    Lipid Panel    Vitamin D deficiency     Maintained on 5000 units daily, most recent vitamin D level 36  Would like to try higher dose of vitamin D to see if this helps with anxiety/depression    Start ergocalciferol 68001 units twice weekly, repeat vitamin D prior to next visit         Relevant Medications    ergocalciferol (Vitamin D-2) 1.25 MG (31370 UT) capsule (Start on 12/14/2023)    Other Relevant Orders    Vitamin D 25-Hydroxy,Total (for eval of Vitamin D levels)    Insomnia    Relevant Medications    traZODone (Desyrel) 50 mg tablet    Other Relevant Orders    Follow Up In Primary Care - Established     Other Visit Diagnoses       Need for influenza vaccination    -  Primary    Relevant Orders    Flu vaccine (IIV4) age 6 months and greater, preservative free (Completed)    Follow Up In Primary Care - Established    Epidermoid cyst of skin of ear        Cyst posterior of right ear, red, firm, tender to touch  Start clindamycin 450mg TID x 7 days, probiotics daily until 1 week after treatment completed    Relevant Medications    clindamycin (Cleocin) 150  mg capsule    Other Relevant Orders    Follow Up In Primary Care - Established    Generalized anxiety disorder        Relevant Medications    busPIRone (Buspar) 10 mg tablet    Other Relevant Orders    Follow Up In Primary Care - Established    Vaginal yeast infection        Relevant Medications    fluconazole (Diflucan) 150 mg tablet    Other Relevant Orders    Follow Up In Primary Care - Established            All pertinent lab work and results were reviewed with patient.     Follow up with me in 4-6 months    Cris Sheriff, APRN-CNS

## 2023-12-13 NOTE — ASSESSMENT & PLAN NOTE
Maintained on 5000 units daily, most recent vitamin D level 36  Would like to try higher dose of vitamin D to see if this helps with anxiety/depression    Start ergocalciferol 70639 units twice weekly, repeat vitamin D prior to next visit

## 2024-05-03 DIAGNOSIS — E55.9 VITAMIN D DEFICIENCY: ICD-10-CM

## 2024-05-05 RX ORDER — ERGOCALCIFEROL 1.25 MG/1
1 CAPSULE ORAL 2 TIMES WEEKLY
Qty: 24 CAPSULE | Refills: 1 | Status: SHIPPED | OUTPATIENT
Start: 2024-05-06 | End: 2024-05-13 | Stop reason: SDUPTHER

## 2024-05-10 ENCOUNTER — LAB (OUTPATIENT)
Dept: LAB | Facility: LAB | Age: 24
End: 2024-05-10
Payer: MEDICAID

## 2024-05-10 DIAGNOSIS — E55.9 VITAMIN D DEFICIENCY: ICD-10-CM

## 2024-05-10 DIAGNOSIS — E78.1 HIGH TRIGLYCERIDES: ICD-10-CM

## 2024-05-10 LAB
25(OH)D3 SERPL-MCNC: 68 NG/ML (ref 30–100)
CHOLEST SERPL-MCNC: 193 MG/DL (ref 0–199)
CHOLESTEROL/HDL RATIO: 3.6
HDLC SERPL-MCNC: 53.9 MG/DL
LDLC SERPL CALC-MCNC: 87 MG/DL
NON HDL CHOLESTEROL: 139 MG/DL (ref 0–149)
TRIGL SERPL-MCNC: 263 MG/DL (ref 0–149)
VLDL: 53 MG/DL (ref 0–40)

## 2024-05-10 PROCEDURE — 80061 LIPID PANEL: CPT

## 2024-05-10 PROCEDURE — 36415 COLL VENOUS BLD VENIPUNCTURE: CPT

## 2024-05-10 PROCEDURE — 82306 VITAMIN D 25 HYDROXY: CPT

## 2024-05-13 ENCOUNTER — OFFICE VISIT (OUTPATIENT)
Dept: PRIMARY CARE | Facility: CLINIC | Age: 24
End: 2024-05-13
Payer: MEDICAID

## 2024-05-13 VITALS
SYSTOLIC BLOOD PRESSURE: 132 MMHG | RESPIRATION RATE: 20 BRPM | DIASTOLIC BLOOD PRESSURE: 82 MMHG | HEIGHT: 60 IN | BODY MASS INDEX: 31.98 KG/M2 | TEMPERATURE: 96.9 F | HEART RATE: 86 BPM | OXYGEN SATURATION: 99 % | WEIGHT: 162.9 LBS

## 2024-05-13 DIAGNOSIS — L72.0 EPIDERMOID CYST OF SKIN OF EAR: ICD-10-CM

## 2024-05-13 DIAGNOSIS — Z00.00 HEALTHCARE MAINTENANCE: Primary | ICD-10-CM

## 2024-05-13 DIAGNOSIS — F41.8 MIXED ANXIETY AND DEPRESSIVE DISORDER: ICD-10-CM

## 2024-05-13 DIAGNOSIS — B37.31 VAGINAL YEAST INFECTION: ICD-10-CM

## 2024-05-13 DIAGNOSIS — E55.9 VITAMIN D DEFICIENCY: ICD-10-CM

## 2024-05-13 DIAGNOSIS — Z13.29 SCREENING FOR THYROID DISORDER: ICD-10-CM

## 2024-05-13 DIAGNOSIS — Z23 NEED FOR INFLUENZA VACCINATION: ICD-10-CM

## 2024-05-13 DIAGNOSIS — G47.00 INSOMNIA, UNSPECIFIED TYPE: ICD-10-CM

## 2024-05-13 DIAGNOSIS — F41.1 GENERALIZED ANXIETY DISORDER: ICD-10-CM

## 2024-05-13 DIAGNOSIS — J30.9 ALLERGIC RHINITIS, UNSPECIFIED SEASONALITY, UNSPECIFIED TRIGGER: ICD-10-CM

## 2024-05-13 DIAGNOSIS — E78.1 HIGH TRIGLYCERIDES: ICD-10-CM

## 2024-05-13 PROBLEM — E66.9 OBESITY: Status: ACTIVE | Noted: 2024-05-13

## 2024-05-13 PROCEDURE — 1036F TOBACCO NON-USER: CPT

## 2024-05-13 PROCEDURE — 99212 OFFICE O/P EST SF 10 MIN: CPT

## 2024-05-13 PROCEDURE — 99395 PREV VISIT EST AGE 18-39: CPT

## 2024-05-13 RX ORDER — ERGOCALCIFEROL 1.25 MG/1
1 CAPSULE ORAL
Qty: 24 CAPSULE | Refills: 1 | Status: SHIPPED | OUTPATIENT
Start: 2024-05-19

## 2024-05-13 RX ORDER — CETIRIZINE HYDROCHLORIDE 10 MG/1
10 TABLET ORAL NIGHTLY
Qty: 90 TABLET | Refills: 3 | Status: SHIPPED | OUTPATIENT
Start: 2024-05-13

## 2024-05-13 RX ORDER — BUSPIRONE HYDROCHLORIDE 10 MG/1
10 TABLET ORAL 2 TIMES DAILY PRN
Qty: 135 TABLET | Refills: 3 | Status: SHIPPED | OUTPATIENT
Start: 2024-05-13 | End: 2025-05-13

## 2024-05-13 RX ORDER — VENLAFAXINE HYDROCHLORIDE 150 MG/1
150 CAPSULE, EXTENDED RELEASE ORAL DAILY
Qty: 90 CAPSULE | Refills: 3 | Status: SHIPPED | OUTPATIENT
Start: 2024-05-13

## 2024-05-13 RX ORDER — MONTELUKAST SODIUM 10 MG/1
10 TABLET ORAL DAILY
Qty: 90 TABLET | Refills: 3 | Status: SHIPPED | OUTPATIENT
Start: 2024-05-13

## 2024-05-13 SDOH — ECONOMIC STABILITY: FOOD INSECURITY: WITHIN THE PAST 12 MONTHS, THE FOOD YOU BOUGHT JUST DIDN'T LAST AND YOU DIDN'T HAVE MONEY TO GET MORE.: NEVER TRUE

## 2024-05-13 SDOH — ECONOMIC STABILITY: FOOD INSECURITY: WITHIN THE PAST 12 MONTHS, YOU WORRIED THAT YOUR FOOD WOULD RUN OUT BEFORE YOU GOT MONEY TO BUY MORE.: NEVER TRUE

## 2024-05-13 ASSESSMENT — ENCOUNTER SYMPTOMS
DEPRESSION: 0
LOSS OF SENSATION IN FEET: 0
PSYCHIATRIC NEGATIVE: 1
CARDIOVASCULAR NEGATIVE: 1
ENDOCRINE NEGATIVE: 1
NEUROLOGICAL NEGATIVE: 1
MUSCULOSKELETAL NEGATIVE: 1
CONSTITUTIONAL NEGATIVE: 1
HEMATOLOGIC/LYMPHATIC NEGATIVE: 1
GASTROINTESTINAL NEGATIVE: 1
EYES NEGATIVE: 1
RESPIRATORY NEGATIVE: 1
OCCASIONAL FEELINGS OF UNSTEADINESS: 0

## 2024-05-13 ASSESSMENT — PATIENT HEALTH QUESTIONNAIRE - PHQ9
SUM OF ALL RESPONSES TO PHQ9 QUESTIONS 1 & 2: 0
2. FEELING DOWN, DEPRESSED OR HOPELESS: NOT AT ALL
1. LITTLE INTEREST OR PLEASURE IN DOING THINGS: NOT AT ALL

## 2024-05-13 ASSESSMENT — LIFESTYLE VARIABLES
HOW OFTEN DO YOU HAVE A DRINK CONTAINING ALCOHOL: NEVER
SKIP TO QUESTIONS 9-10: 1
HOW MANY STANDARD DRINKS CONTAINING ALCOHOL DO YOU HAVE ON A TYPICAL DAY: PATIENT DOES NOT DRINK
AUDIT-C TOTAL SCORE: 0
HOW OFTEN DO YOU HAVE SIX OR MORE DRINKS ON ONE OCCASION: NEVER

## 2024-05-13 ASSESSMENT — PAIN SCALES - GENERAL: PAINLEVEL: 0-NO PAIN

## 2024-05-13 NOTE — ASSESSMENT & PLAN NOTE
- Healthy diet, good quality and duration of sleep, healthy water intake, regular exercise and healthy weight discussed  Vaccines   Flu: Recommended annually  Tdap: Current as of 2/2021  - GYN/PAP: Last PAP 1/2022  -Following with dentistry and optometry regularly  -No symptoms of depression/concerns for anxiety  -Feeling safe at home  -Skin cancer prevention

## 2024-05-13 NOTE — ASSESSMENT & PLAN NOTE
Triglycerides elevated at 263 on labs from 5/2024  Encouraged to continue omega 3 fatty acid and dietary changes     Repeat fasting lipid panel prior to next visit

## 2024-05-13 NOTE — PROGRESS NOTES
Subjective   Patient ID: Debo Bradley is a 24 y.o. female who presents for 5 month follow up of mood.     Buspirone increased to 10mg TID since last visit, taking daily with PRN second dose, mood is fine on this regimen.  Vitamin D increased to [address] twice weekly, Vitamin D at 68, decrease back to weekly  Started on clindamycin for cyst of ear at last visit, resolved    Diet: Doesn't eat seafood, tries to avoid sugary foods, only about once per day. Has since stopped using artifical creamer in coffee. Good amount of fruits and veggies.   Exercise: No regular exercise  Weight: Stable  Water: Drinking at least 1-2 glasses per day  Sleep: Much improved with addition of trazodone for sleep  Social: Single, lives with her mom and sister in a two floor home. Cats, guinea pig, avel.   Professional: Recently in school, [employer].  BA graduate in criminal justice or sociology. Looking for a career    Review of Systems   Constitutional: Negative.    HENT: Negative.     Eyes: Negative.    Respiratory: Negative.     Cardiovascular: Negative.    Gastrointestinal: Negative.    Endocrine: Negative.    Genitourinary: Negative.    Musculoskeletal: Negative.    Skin: Negative.    Neurological: Negative.    Hematological: Negative.    Psychiatric/Behavioral: Negative.          Current Outpatient Medications   Medication Sig Dispense Refill    adapalene (Differin) 0.3 % gel Apply topically once daily at bedtime.      drospirenone-ethinyl estradioL (Karo, Gianvi) 3-0.02 mg tablet Take 1 tablet by mouth once daily.      fluorouracil (Efudex) 5 % cream APPLY TOPICALLY TO THE AFFECTED AREA TWICE DAILY THEN COVER WITH A BANDAID      ketoconazole (NIZOral) 2 % shampoo APPLY TO SCALP IN SHOWER LET SIT FOR 5 MINUTES THEN RINSE ONCE A DAY      omega-3 acid ethyl esters (Lovaza) 1 gram capsule Take 2 capsules (2 g) by mouth 2 times a day. 120 capsule 11    spironolactone (Aldactone) 50 mg tablet Take 1 tablet (50 mg) by mouth once  daily.      tazarotene (Arazlo) 0.045 % lotion APPLY TO FACE 2 TO 3 NIGHTS PER WEEK INCREASING TO NIGHTLY AS TOLERATED      traZODone (Desyrel) 50 mg tablet Take 1 tablet (50 mg) by mouth as needed at bedtime for sleep. 30 tablet 11    busPIRone (Buspar) 10 mg tablet Take 1 tablet (10 mg) by mouth 2 times a day as needed (anxiety). 135 tablet 3    cetirizine (ZyrTEC) 10 mg tablet Take 1 tablet (10 mg) by mouth once daily at bedtime. 90 tablet 3    [START ON 5/19/2024] ergocalciferol (Vitamin D-2) 1.25 MG (27042 UT) capsule Take 1 capsule (1,250 mcg) by mouth 1 (one) time per week. 24 capsule 1    montelukast (Singulair) 10 mg tablet Take 1 tablet (10 mg) by mouth once daily. 90 tablet 3    omega-3 acid ethyl esters (Lovaza) 1 gram capsule Take 1 capsule (1 g) by mouth 2 times a day. (Patient not taking: Reported on 5/13/2024) 60 capsule 11    venlafaxine XR (Effexor-XR) 150 mg 24 hr capsule Take 1 capsule (150 mg) by mouth once daily. Do not crush or chew. 90 capsule 3     No current facility-administered medications for this visit.     Past Surgical History:   Procedure Laterality Date    ADENOIDECTOMY      OTHER SURGICAL HISTORY  07/31/2020    Tonsillectomy with adenoidectomy    OTHER SURGICAL HISTORY  07/31/2020    Foot surgery    OTHER SURGICAL HISTORY  07/31/2020    Ear surgery    TONSILLECTOMY      WISDOM TOOTH EXTRACTION       Family History   Problem Relation Name Age of Onset    Diabetes Mother Lisbeth     Other (abnormal pap) Mother Lisbeth     Diabetes Maternal Grandmother Sandra     Hypertension Maternal Grandmother Sandra     Depression Maternal Grandmother Sandra     Hyperlipidemia Maternal Grandmother Sandra     Other (vertigo) Maternal Grandmother Sandra     Dementia Other      Psoriasis Other      Hyperlipidemia Other        Social History     Tobacco Use    Smoking status: Never     Passive exposure: Never    Smokeless tobacco: Never   Vaping Use    Vaping status: Never Used   Substance Use Topics    Alcohol use: Yes      Comment: once in a blue moon    Drug use: Yes     Types: Marijuana        Objective     Visit Vitals  /82 (BP Location: Right arm, Patient Position: Sitting, BP Cuff Size: Adult)   Pulse 86   Temp 36.1 °C (96.9 °F)   Resp 20   Ht 1.524 m (5')   Wt 73.9 kg (162 lb 14.4 oz)   SpO2 99%   BMI 31.81 kg/m²   Smoking Status Never   BSA 1.77 m²        Physical Exam  Constitutional:       Appearance: Normal appearance.   HENT:      Head: Normocephalic and atraumatic.   Eyes:      Extraocular Movements: Extraocular movements intact.      Pupils: Pupils are equal, round, and reactive to light.   Cardiovascular:      Rate and Rhythm: Normal rate and regular rhythm.   Pulmonary:      Effort: Pulmonary effort is normal.      Breath sounds: Normal breath sounds.   Abdominal:      General: Abdomen is flat. Bowel sounds are normal.      Palpations: Abdomen is soft.   Musculoskeletal:         General: Normal range of motion.   Skin:     General: Skin is warm and dry.      Capillary Refill: Capillary refill takes less than 2 seconds.   Neurological:      General: No focal deficit present.      Mental Status: She is alert and oriented to person, place, and time.   Psychiatric:         Mood and Affect: Mood normal.         Behavior: Behavior normal.           Assessment/Plan   Problem List Items Addressed This Visit       Allergic rhinitis    Relevant Medications    cetirizine (ZyrTEC) 10 mg tablet    montelukast (Singulair) 10 mg tablet    Mixed anxiety and depressive disorder     Buspirone increased to 10mg as needed at last visit  Doing well on this change, taking 10mg daily with periodic second dose as needed    Continue venlafaxine XR 50mg daily and buspirone 10mg up to TID PRN         Relevant Medications    busPIRone (Buspar) 10 mg tablet    venlafaxine XR (Effexor-XR) 150 mg 24 hr capsule    High triglycerides     Triglycerides elevated at 263 on labs from 5/2024  Encouraged to continue omega 3 fatty acid and dietary  changes     Repeat fasting lipid panel prior to next visit         Relevant Orders    CBC    Comprehensive Metabolic Panel    Lipid Panel    Vitamin D deficiency    Relevant Medications    ergocalciferol (Vitamin D-2) 1.25 MG (36648 UT) capsule (Start on 5/19/2024)    Other Relevant Orders    Vitamin D 25-Hydroxy,Total (for eval of Vitamin D levels)    Insomnia    Epidermoid cyst of skin of ear    Healthcare maintenance - Primary     - Healthy diet, good quality and duration of sleep, healthy water intake, regular exercise and healthy weight discussed  Vaccines   Flu: Recommended annually  Tdap: Current as of 2/2021  - GYN/PAP: Last PAP 1/2022  -Following with dentistry and optometry regularly  -No symptoms of depression/concerns for anxiety  -Feeling safe at home  -Skin cancer prevention           Other Visit Diagnoses       Need for influenza vaccination        Generalized anxiety disorder        Relevant Medications    busPIRone (Buspar) 10 mg tablet    venlafaxine XR (Effexor-XR) 150 mg 24 hr capsule    Vaginal yeast infection        Screening for thyroid disorder        Relevant Orders    TSH with reflex to Free T4 if abnormal            All pertinent lab work and results were reviewed with patient.     Follow up with me in 7 months     MELI Rico-CNS

## 2024-05-13 NOTE — ASSESSMENT & PLAN NOTE
Buspirone increased to 10mg as needed at last visit  Doing well on this change, taking 10mg daily with periodic second dose as needed    Continue venlafaxine XR 50mg daily and buspirone 10mg up to TID PRN

## 2024-05-13 NOTE — PATIENT INSTRUCTIONS
Thank you for coming to see me today.  If you have any questions or concerns following our visit, please contact the office.  Phone: [phone]    Follow up with me in December 2024 or sooner as needed    1)  DECREASE ergocalciferol to 03263 units weekly    2) Get fasting labwork a few days prior to next visit.  The lab is down the [address] from our office.

## 2024-06-30 DIAGNOSIS — E78.1 HIGH TRIGLYCERIDES: ICD-10-CM

## 2024-07-01 RX ORDER — OMEGA-3-ACID ETHYL ESTERS 1 G/1
2 CAPSULE, LIQUID FILLED ORAL 2 TIMES DAILY
Qty: 120 CAPSULE | Refills: 11 | Status: SHIPPED | OUTPATIENT
Start: 2024-07-01

## 2024-12-09 ENCOUNTER — LAB (OUTPATIENT)
Dept: LAB | Facility: LAB | Age: 24
End: 2024-12-09
Payer: MEDICAID

## 2024-12-09 DIAGNOSIS — E78.1 HIGH TRIGLYCERIDES: ICD-10-CM

## 2024-12-09 DIAGNOSIS — Z13.29 SCREENING FOR THYROID DISORDER: ICD-10-CM

## 2024-12-09 DIAGNOSIS — E55.9 VITAMIN D DEFICIENCY: ICD-10-CM

## 2024-12-09 LAB
25(OH)D3 SERPL-MCNC: 66 NG/ML (ref 30–100)
ALBUMIN SERPL BCP-MCNC: 4.1 G/DL (ref 3.4–5)
ALP SERPL-CCNC: 41 U/L (ref 33–110)
ALT SERPL W P-5'-P-CCNC: 10 U/L (ref 7–45)
ANION GAP SERPL CALC-SCNC: 15 MMOL/L (ref 10–20)
AST SERPL W P-5'-P-CCNC: 12 U/L (ref 9–39)
BILIRUB SERPL-MCNC: 0.6 MG/DL (ref 0–1.2)
BUN SERPL-MCNC: 11 MG/DL (ref 6–23)
CALCIUM SERPL-MCNC: 9.3 MG/DL (ref 8.6–10.3)
CHLORIDE SERPL-SCNC: 102 MMOL/L (ref 98–107)
CHOLEST SERPL-MCNC: 196 MG/DL (ref 0–199)
CHOLESTEROL/HDL RATIO: 2.9
CO2 SERPL-SCNC: 26 MMOL/L (ref 21–32)
CREAT SERPL-MCNC: 0.93 MG/DL (ref 0.5–1.05)
EGFRCR SERPLBLD CKD-EPI 2021: 87 ML/MIN/1.73M*2
ERYTHROCYTE [DISTWIDTH] IN BLOOD BY AUTOMATED COUNT: 12.8 % (ref 11.5–14.5)
GLUCOSE SERPL-MCNC: 84 MG/DL (ref 74–99)
HCT VFR BLD AUTO: 42.9 % (ref 36–46)
HDLC SERPL-MCNC: 68.4 MG/DL
HGB BLD-MCNC: 14.5 G/DL (ref 12–16)
LDLC SERPL CALC-MCNC: 69 MG/DL
MCH RBC QN AUTO: 30 PG (ref 26–34)
MCHC RBC AUTO-ENTMCNC: 33.8 G/DL (ref 32–36)
MCV RBC AUTO: 89 FL (ref 80–100)
NON HDL CHOLESTEROL: 128 MG/DL (ref 0–149)
NRBC BLD-RTO: 0 /100 WBCS (ref 0–0)
PLATELET # BLD AUTO: 285 X10*3/UL (ref 150–450)
POTASSIUM SERPL-SCNC: 3.9 MMOL/L (ref 3.5–5.3)
PROT SERPL-MCNC: 6.6 G/DL (ref 6.4–8.2)
RBC # BLD AUTO: 4.84 X10*6/UL (ref 4–5.2)
SODIUM SERPL-SCNC: 139 MMOL/L (ref 136–145)
TRIGL SERPL-MCNC: 293 MG/DL (ref 0–149)
TSH SERPL-ACNC: 3.52 MIU/L (ref 0.44–3.98)
VLDL: 59 MG/DL (ref 0–40)
WBC # BLD AUTO: 6.6 X10*3/UL (ref 4.4–11.3)

## 2024-12-09 PROCEDURE — 80061 LIPID PANEL: CPT

## 2024-12-09 PROCEDURE — 85027 COMPLETE CBC AUTOMATED: CPT

## 2024-12-09 PROCEDURE — 84443 ASSAY THYROID STIM HORMONE: CPT

## 2024-12-09 PROCEDURE — 82306 VITAMIN D 25 HYDROXY: CPT

## 2024-12-09 PROCEDURE — 80053 COMPREHEN METABOLIC PANEL: CPT

## 2024-12-09 PROCEDURE — 36415 COLL VENOUS BLD VENIPUNCTURE: CPT

## 2024-12-12 ENCOUNTER — APPOINTMENT (OUTPATIENT)
Dept: PRIMARY CARE | Facility: CLINIC | Age: 24
End: 2024-12-12
Payer: MEDICAID

## 2024-12-19 DIAGNOSIS — F41.1 GENERALIZED ANXIETY DISORDER: ICD-10-CM

## 2024-12-20 RX ORDER — BUSPIRONE HYDROCHLORIDE 10 MG/1
TABLET ORAL
Qty: 135 TABLET | Refills: 3 | Status: SHIPPED | OUTPATIENT
Start: 2024-12-20

## 2024-12-27 ENCOUNTER — APPOINTMENT (OUTPATIENT)
Dept: PRIMARY CARE | Facility: CLINIC | Age: 24
End: 2024-12-27
Payer: MEDICAID

## 2024-12-27 VITALS
OXYGEN SATURATION: 98 % | SYSTOLIC BLOOD PRESSURE: 110 MMHG | HEIGHT: 60 IN | BODY MASS INDEX: 31.02 KG/M2 | WEIGHT: 158 LBS | TEMPERATURE: 97.6 F | RESPIRATION RATE: 16 BRPM | HEART RATE: 95 BPM | DIASTOLIC BLOOD PRESSURE: 78 MMHG

## 2024-12-27 DIAGNOSIS — J30.9 ALLERGIC RHINITIS, UNSPECIFIED SEASONALITY, UNSPECIFIED TRIGGER: ICD-10-CM

## 2024-12-27 DIAGNOSIS — F41.1 GENERALIZED ANXIETY DISORDER: ICD-10-CM

## 2024-12-27 DIAGNOSIS — E78.1 HIGH TRIGLYCERIDES: ICD-10-CM

## 2024-12-27 DIAGNOSIS — F41.8 MIXED ANXIETY AND DEPRESSIVE DISORDER: Primary | ICD-10-CM

## 2024-12-27 DIAGNOSIS — Z59.41 MILD FOOD INSECURITY: ICD-10-CM

## 2024-12-27 PROCEDURE — 3008F BODY MASS INDEX DOCD: CPT

## 2024-12-27 PROCEDURE — 99213 OFFICE O/P EST LOW 20 MIN: CPT

## 2024-12-27 PROCEDURE — 1036F TOBACCO NON-USER: CPT

## 2024-12-27 RX ORDER — VENLAFAXINE HYDROCHLORIDE 150 MG/1
150 CAPSULE, EXTENDED RELEASE ORAL DAILY
Qty: 90 CAPSULE | Refills: 3 | Status: SHIPPED | OUTPATIENT
Start: 2024-12-27

## 2024-12-27 RX ORDER — MONTELUKAST SODIUM 10 MG/1
10 TABLET ORAL DAILY
Qty: 90 TABLET | Refills: 3 | Status: SHIPPED | OUTPATIENT
Start: 2024-12-27

## 2024-12-27 RX ORDER — CETIRIZINE HYDROCHLORIDE 10 MG/1
10 TABLET ORAL NIGHTLY
Qty: 90 TABLET | Refills: 3 | Status: SHIPPED | OUTPATIENT
Start: 2024-12-27

## 2024-12-27 SDOH — ECONOMIC STABILITY: FOOD INSECURITY: WITHIN THE PAST 12 MONTHS, THE FOOD YOU BOUGHT JUST DIDN'T LAST AND YOU DIDN'T HAVE MONEY TO GET MORE.: SOMETIMES TRUE

## 2024-12-27 SDOH — ECONOMIC STABILITY - FOOD INSECURITY: FOOD INSECURITY: Z59.41

## 2024-12-27 SDOH — ECONOMIC STABILITY: FOOD INSECURITY: WITHIN THE PAST 12 MONTHS, YOU WORRIED THAT YOUR FOOD WOULD RUN OUT BEFORE YOU GOT MONEY TO BUY MORE.: SOMETIMES TRUE

## 2024-12-27 SDOH — ECONOMIC STABILITY: FOOD INSECURITY: WITHIN THE PAST 12 MONTHS, THE FOOD YOU BOUGHT JUST DIDN'T LAST AND YOU DIDN'T HAVE MONEY TO GET MORE.: NEVER TRUE

## 2024-12-27 SDOH — ECONOMIC STABILITY: FOOD INSECURITY: WITHIN THE PAST 12 MONTHS, YOU WORRIED THAT YOUR FOOD WOULD RUN OUT BEFORE YOU GOT MONEY TO BUY MORE.: NEVER TRUE

## 2024-12-27 ASSESSMENT — ENCOUNTER SYMPTOMS
CARDIOVASCULAR NEGATIVE: 1
RESPIRATORY NEGATIVE: 1
EYES NEGATIVE: 1
LOSS OF SENSATION IN FEET: 0
GASTROINTESTINAL NEGATIVE: 1
MUSCULOSKELETAL NEGATIVE: 1
HEMATOLOGIC/LYMPHATIC NEGATIVE: 1
OCCASIONAL FEELINGS OF UNSTEADINESS: 0
ENDOCRINE NEGATIVE: 1
PSYCHIATRIC NEGATIVE: 1
DEPRESSION: 0
NEUROLOGICAL NEGATIVE: 1
CONSTITUTIONAL NEGATIVE: 1

## 2024-12-27 ASSESSMENT — PATIENT HEALTH QUESTIONNAIRE - PHQ9
2. FEELING DOWN, DEPRESSED OR HOPELESS: NOT AT ALL
SUM OF ALL RESPONSES TO PHQ9 QUESTIONS 1 AND 2: 0
1. LITTLE INTEREST OR PLEASURE IN DOING THINGS: NOT AT ALL
1. LITTLE INTEREST OR PLEASURE IN DOING THINGS: NOT AT ALL
SUM OF ALL RESPONSES TO PHQ9 QUESTIONS 1 AND 2: 0
2. FEELING DOWN, DEPRESSED OR HOPELESS: NOT AT ALL

## 2024-12-27 ASSESSMENT — LIFESTYLE VARIABLES
SKIP TO QUESTIONS 9-10: 1
HOW OFTEN DO YOU HAVE A DRINK CONTAINING ALCOHOL: NEVER
AUDIT-C TOTAL SCORE: 0
HOW OFTEN DO YOU HAVE SIX OR MORE DRINKS ON ONE OCCASION: NEVER
HOW MANY STANDARD DRINKS CONTAINING ALCOHOL DO YOU HAVE ON A TYPICAL DAY: PATIENT DOES NOT DRINK

## 2024-12-27 ASSESSMENT — COLUMBIA-SUICIDE SEVERITY RATING SCALE - C-SSRS
2. HAVE YOU ACTUALLY HAD ANY THOUGHTS OF KILLING YOURSELF?: NO
6. HAVE YOU EVER DONE ANYTHING, STARTED TO DO ANYTHING, OR PREPARED TO DO ANYTHING TO END YOUR LIFE?: NO
1. IN THE PAST MONTH, HAVE YOU WISHED YOU WERE DEAD OR WISHED YOU COULD GO TO SLEEP AND NOT WAKE UP?: NO

## 2024-12-27 ASSESSMENT — PAIN SCALES - GENERAL: PAINLEVEL_OUTOF10: 0-NO PAIN

## 2024-12-27 NOTE — PROGRESS NOTES
Subjective   Patient ID: Debo Bradley is a 24 y.o. female who presents for 7 month follow up of mood.    Doing well on current regimen, feels mood is well controlled.  Using buspirone periodically.     Diet: Doesn't eat seafood, tries to avoid sugary foods, only about once per day. Has since stopped using artifical creamer in coffee. Good amount of fruits and veggies.   Exercise: No regular exercise  Weight: Stable  Water: Drinking at least 1-2 glasses per day  Sleep: Much improved with addition of trazodone for sleep  Social: Single, lives with her mom and sister in a two floor home. Cats, guinea pig, avel.   Professional: Recently in school, [employer].  BA graduate in criminal justice or sociology. Looking for a career    Review of Systems   Constitutional: Negative.    HENT: Negative.     Eyes: Negative.    Respiratory: Negative.     Cardiovascular: Negative.    Gastrointestinal: Negative.    Endocrine: Negative.    Genitourinary: Negative.    Musculoskeletal: Negative.    Skin: Negative.    Neurological: Negative.    Hematological: Negative.    Psychiatric/Behavioral: Negative.          Current Outpatient Medications   Medication Sig Dispense Refill    adapalene (Differin) 0.3 % gel Apply topically once daily at bedtime.      busPIRone (Buspar) 10 mg tablet TAKE 1 TABLET(10 MG) BY MOUTH TWICE DAILY AS NEEDED FOR ANXIETY 135 tablet 3    drospirenone-ethinyl estradioL (Karo, Gianvi) 3-0.02 mg tablet Take 1 tablet by mouth once daily.      ergocalciferol (Vitamin D-2) 1.25 MG (65917 UT) capsule Take 1 capsule (1,250 mcg) by mouth 1 (one) time per week. 24 capsule 1    fluorouracil (Efudex) 5 % cream APPLY TOPICALLY TO THE AFFECTED AREA TWICE DAILY THEN COVER WITH A BANDAID      ketoconazole (NIZOral) 2 % shampoo APPLY TO SCALP IN SHOWER LET SIT FOR 5 MINUTES THEN RINSE ONCE A DAY      omega-3 acid ethyl esters (Lovaza) 1 gram capsule TAKE 2 CAPSULES BY MOUTH TWICE DAILY 120 capsule 11    spironolactone  (Aldactone) 50 mg tablet Take 1 tablet (50 mg) by mouth once daily.      tazarotene (Arazlo) 0.045 % lotion APPLY TO FACE 2 TO 3 NIGHTS PER WEEK INCREASING TO NIGHTLY AS TOLERATED      cetirizine (ZyrTEC) 10 mg tablet Take 1 tablet (10 mg) by mouth once daily at bedtime. 90 tablet 3    montelukast (Singulair) 10 mg tablet Take 1 tablet (10 mg) by mouth once daily. 90 tablet 3    traZODone (Desyrel) 50 mg tablet Take 1 tablet (50 mg) by mouth as needed at bedtime for sleep. 30 tablet 11    venlafaxine XR (Effexor-XR) 150 mg 24 hr capsule Take 1 capsule (150 mg) by mouth once daily. Do not crush or chew. 90 capsule 3     No current facility-administered medications for this visit.     Past Surgical History:   Procedure Laterality Date    ADENOIDECTOMY      OTHER SURGICAL HISTORY  07/31/2020    Tonsillectomy with adenoidectomy    OTHER SURGICAL HISTORY  07/31/2020    Foot surgery    OTHER SURGICAL HISTORY  07/31/2020    Ear surgery    TONSILLECTOMY      WISDOM TOOTH EXTRACTION       Family History   Problem Relation Name Age of Onset    Diabetes Mother Lisbeth     Other (abnormal pap) Mother Lisbeth     Diabetes Maternal Grandmother Sandra     Hypertension Maternal Grandmother Sandra     Depression Maternal Grandmother Sandra     Hyperlipidemia Maternal Grandmother Sandra     Other (vertigo) Maternal Grandmother Sandra     Dementia Other      Psoriasis Other      Hyperlipidemia Other        Social History     Tobacco Use    Smoking status: Never     Passive exposure: Never    Smokeless tobacco: Never   Vaping Use    Vaping status: Never Used   Substance Use Topics    Alcohol use: Yes     Comment: once in a blue moon    Drug use: Yes     Types: Marijuana        Objective     Visit Vitals  /78 (BP Location: Right arm, Patient Position: Sitting, BP Cuff Size: Adult)   Pulse 95   Temp 36.4 °C (97.6 °F) (Temporal)   Resp 16   Ht 1.524 m (5')   Wt 71.7 kg (158 lb)   SpO2 98%   BMI 30.86 kg/m²   Smoking Status Never   BSA 1.74 m²         Physical Exam  Constitutional:       Appearance: Normal appearance.   HENT:      Head: Normocephalic and atraumatic.   Eyes:      Extraocular Movements: Extraocular movements intact.      Pupils: Pupils are equal, round, and reactive to light.   Cardiovascular:      Rate and Rhythm: Normal rate and regular rhythm.   Pulmonary:      Effort: Pulmonary effort is normal.      Breath sounds: Normal breath sounds.   Abdominal:      General: Abdomen is flat. Bowel sounds are normal.      Palpations: Abdomen is soft.   Musculoskeletal:         General: Normal range of motion.   Skin:     General: Skin is warm and dry.      Capillary Refill: Capillary refill takes less than 2 seconds.   Neurological:      General: No focal deficit present.      Mental Status: She is alert and oriented to person, place, and time.   Psychiatric:         Mood and Affect: Mood normal.         Behavior: Behavior normal.           Assessment/Plan   Problem List Items Addressed This Visit       Allergic rhinitis    Relevant Medications    cetirizine (ZyrTEC) 10 mg tablet    montelukast (Singulair) 10 mg tablet    Mixed anxiety and depressive disorder - Primary     Reports mood is well controlled on current regimen, feels medications are adequate and do not need adjustment    Continue buspirone 10mg TID PRN and venlafaxine XR 150mg daily         Relevant Medications    venlafaxine XR (Effexor-XR) 150 mg 24 hr capsule    High triglycerides     Triglycerides remain elevated at 293 on labs from 12/2024  States she's adherent with fish oil therapy. Endorses difficulty affording healthy groceries    Referral placed for [employer] to help with healthy eating          Other Visit Diagnoses       Generalized anxiety disorder        Relevant Medications    venlafaxine XR (Effexor-XR) 150 mg 24 hr capsule    Mild food insecurity        Relevant Orders    Referral to [employer]            All pertinent lab work and results were reviewed with patient.      Follow up with me in May for CPE    Cris Sheriff, APRN-CNS

## 2024-12-27 NOTE — PATIENT INSTRUCTIONS
Thank you for coming to see me today.  If you have any questions or concerns following our visit, please contact the office.  Phone: [phone]    Follow up with me in 6 months for wellness visit    1)  I am referring you to [facility], please call [phone] to make an appointment

## 2024-12-27 NOTE — ASSESSMENT & PLAN NOTE
Reports mood is well controlled on current regimen, feels medications are adequate and do not need adjustment    Continue buspirone 10mg TID PRN and venlafaxine XR 150mg daily

## 2024-12-27 NOTE — ASSESSMENT & PLAN NOTE
Triglycerides remain elevated at 293 on labs from 12/2024  States she's adherent with fish oil therapy. Endorses difficulty affording healthy groceries    Referral placed for [employer] [facility] [employer] to help with healthy eating

## 2024-12-31 ENCOUNTER — APPOINTMENT (OUTPATIENT)
Dept: PRIMARY CARE | Facility: CLINIC | Age: 24
End: 2024-12-31
Payer: MEDICAID

## 2025-01-14 DIAGNOSIS — G47.00 INSOMNIA, UNSPECIFIED TYPE: ICD-10-CM

## 2025-01-14 RX ORDER — TAZAROTENE 0.5 MG/G
GEL TOPICAL
COMMUNITY
Start: 2024-10-28

## 2025-01-15 RX ORDER — TRAZODONE HYDROCHLORIDE 50 MG/1
50 TABLET ORAL NIGHTLY PRN
Qty: 30 TABLET | Refills: 11 | Status: SHIPPED | OUTPATIENT
Start: 2025-01-15

## 2025-05-14 ENCOUNTER — TELEPHONE (OUTPATIENT)
Dept: PRIMARY CARE | Facility: CLINIC | Age: 25
End: 2025-05-14
Payer: MEDICAID

## 2025-05-14 DIAGNOSIS — E78.1 HIGH TRIGLYCERIDES: Primary | ICD-10-CM

## 2025-05-14 NOTE — TELEPHONE ENCOUNTER
Patient called in wanting to know if you wanted her to do labs before her appointment Friday. No lab orders are currently in.

## 2025-05-16 ENCOUNTER — APPOINTMENT (OUTPATIENT)
Dept: PRIMARY CARE | Facility: CLINIC | Age: 25
End: 2025-05-16
Payer: MEDICAID

## 2025-05-16 VITALS
WEIGHT: 159 LBS | SYSTOLIC BLOOD PRESSURE: 126 MMHG | HEART RATE: 100 BPM | RESPIRATION RATE: 14 BRPM | OXYGEN SATURATION: 100 % | HEIGHT: 60 IN | BODY MASS INDEX: 31.22 KG/M2 | TEMPERATURE: 97.7 F | DIASTOLIC BLOOD PRESSURE: 89 MMHG

## 2025-05-16 DIAGNOSIS — E78.1 HIGH TRIGLYCERIDES: ICD-10-CM

## 2025-05-16 DIAGNOSIS — M79.644 BILATERAL THUMB PAIN: ICD-10-CM

## 2025-05-16 DIAGNOSIS — F41.1 GENERALIZED ANXIETY DISORDER: ICD-10-CM

## 2025-05-16 DIAGNOSIS — E55.9 VITAMIN D DEFICIENCY: ICD-10-CM

## 2025-05-16 DIAGNOSIS — J30.9 ALLERGIC RHINITIS, UNSPECIFIED SEASONALITY, UNSPECIFIED TRIGGER: ICD-10-CM

## 2025-05-16 DIAGNOSIS — G47.00 INSOMNIA, UNSPECIFIED TYPE: ICD-10-CM

## 2025-05-16 DIAGNOSIS — M79.645 BILATERAL THUMB PAIN: ICD-10-CM

## 2025-05-16 DIAGNOSIS — Z00.00 HEALTHCARE MAINTENANCE: Primary | ICD-10-CM

## 2025-05-16 DIAGNOSIS — Z01.419 WELL WOMAN EXAM: ICD-10-CM

## 2025-05-16 PROCEDURE — 99213 OFFICE O/P EST LOW 20 MIN: CPT

## 2025-05-16 PROCEDURE — 99395 PREV VISIT EST AGE 18-39: CPT

## 2025-05-16 PROCEDURE — 1036F TOBACCO NON-USER: CPT

## 2025-05-16 PROCEDURE — 3008F BODY MASS INDEX DOCD: CPT

## 2025-05-16 RX ORDER — OMEGA-3-ACID ETHYL ESTERS 1 G/1
2 CAPSULE, LIQUID FILLED ORAL 2 TIMES DAILY
Qty: 360 CAPSULE | Refills: 3 | Status: SHIPPED | OUTPATIENT
Start: 2025-05-16

## 2025-05-16 RX ORDER — CETIRIZINE HYDROCHLORIDE 10 MG/1
10 TABLET ORAL NIGHTLY
Qty: 90 TABLET | Refills: 3 | Status: SHIPPED | OUTPATIENT
Start: 2025-05-16

## 2025-05-16 RX ORDER — VENLAFAXINE HYDROCHLORIDE 150 MG/1
150 CAPSULE, EXTENDED RELEASE ORAL DAILY
Qty: 90 CAPSULE | Refills: 3 | Status: SHIPPED | OUTPATIENT
Start: 2025-05-16

## 2025-05-16 RX ORDER — DICLOFENAC SODIUM 10 MG/G
4 GEL TOPICAL 4 TIMES DAILY
Qty: 100 G | Refills: 1 | Status: SHIPPED | OUTPATIENT
Start: 2025-05-16

## 2025-05-16 RX ORDER — TRAZODONE HYDROCHLORIDE 50 MG/1
50 TABLET ORAL NIGHTLY PRN
Qty: 30 TABLET | Refills: 11 | Status: SHIPPED | OUTPATIENT
Start: 2025-05-16

## 2025-05-16 SDOH — ECONOMIC STABILITY: FOOD INSECURITY: WITHIN THE PAST 12 MONTHS, THE FOOD YOU BOUGHT JUST DIDN'T LAST AND YOU DIDN'T HAVE MONEY TO GET MORE.: NEVER TRUE

## 2025-05-16 SDOH — ECONOMIC STABILITY: FOOD INSECURITY: WITHIN THE PAST 12 MONTHS, YOU WORRIED THAT YOUR FOOD WOULD RUN OUT BEFORE YOU GOT MONEY TO BUY MORE.: NEVER TRUE

## 2025-05-16 ASSESSMENT — ENCOUNTER SYMPTOMS
HEMATOLOGIC/LYMPHATIC NEGATIVE: 1
RESPIRATORY NEGATIVE: 1
GASTROINTESTINAL NEGATIVE: 1
CARDIOVASCULAR NEGATIVE: 1
EYES NEGATIVE: 1
MUSCULOSKELETAL NEGATIVE: 1
NEUROLOGICAL NEGATIVE: 1
CONSTITUTIONAL NEGATIVE: 1
PSYCHIATRIC NEGATIVE: 1
ENDOCRINE NEGATIVE: 1

## 2025-05-16 ASSESSMENT — ANXIETY QUESTIONNAIRES
6. BECOMING EASILY ANNOYED OR IRRITABLE: SEVERAL DAYS
4. TROUBLE RELAXING: SEVERAL DAYS
GAD7 TOTAL SCORE: 3
5. BEING SO RESTLESS THAT IT IS HARD TO SIT STILL: NOT AT ALL
1. FEELING NERVOUS, ANXIOUS, OR ON EDGE: SEVERAL DAYS
IF YOU CHECKED OFF ANY PROBLEMS ON THIS QUESTIONNAIRE, HOW DIFFICULT HAVE THESE PROBLEMS MADE IT FOR YOU TO DO YOUR WORK, TAKE CARE OF THINGS AT HOME, OR GET ALONG WITH OTHER PEOPLE: SOMEWHAT DIFFICULT
2. NOT BEING ABLE TO STOP OR CONTROL WORRYING: NOT AT ALL
7. FEELING AFRAID AS IF SOMETHING AWFUL MIGHT HAPPEN: NOT AT ALL
3. WORRYING TOO MUCH ABOUT DIFFERENT THINGS: NOT AT ALL

## 2025-05-16 ASSESSMENT — PAIN SCALES - GENERAL: PAINLEVEL_OUTOF10: 3

## 2025-05-16 ASSESSMENT — LIFESTYLE VARIABLES
HOW OFTEN DO YOU HAVE A DRINK CONTAINING ALCOHOL: NEVER
AUDIT-C TOTAL SCORE: 0
HOW MANY STANDARD DRINKS CONTAINING ALCOHOL DO YOU HAVE ON A TYPICAL DAY: PATIENT DOES NOT DRINK
SKIP TO QUESTIONS 9-10: 1
HOW OFTEN DO YOU HAVE SIX OR MORE DRINKS ON ONE OCCASION: NEVER

## 2025-05-16 NOTE — ASSESSMENT & PLAN NOTE
Triglycerdies elevated at 293 on labs from 12/2024  Has been on omega 3 fatty acid 2g BID for 2 years without much improvement  Given her age would be hesitant to put her on statin until all lifestyle measures exhausted    Repeat lipid panel in 2-3 months  Encouraged to get more exercise to see if this helps

## 2025-05-16 NOTE — PROGRESS NOTES
Subjective   Patient ID: Debo Bradley is a 25 y.o. female who presents for CPE.    Patient had annual labs done in December.    Using buspirone more in the winter during her brother's hockey season.     Has been crocheting and having pain in her bilateral thumbs. History of hyperextension of joints, having a lot of distal joint thumb pain.     Diet: Wasn't able to make it to [facility]. Going to [facility] as needed, trying to get more fruit into diet. Doesn't eat seafood, tries to avoid sugary foods, only about once per day. No more creamer, using almond milk in coffee. Concerned about dairy intolerance. Good amount of fruits and veggies.   Exercise: Walking through the day  Weight: Stable  Water: Drinking at least 1-2 glasses per day  Sleep: Much improved with addition of trazodone for sleep  Social: Single, lives with her mom and sister in a two floor home. Father and stepmother adopted a boy who is 6 year brother and 10 year old sister. Cats, guinea pig, avel.   Professional: Recently in school, [employer].  BA graduate in criminal justice or sociology. Looking for a career    Review of Systems   Constitutional: Negative.    HENT: Negative.     Eyes: Negative.    Respiratory: Negative.     Cardiovascular: Negative.    Gastrointestinal: Negative.    Endocrine: Negative.    Genitourinary: Negative.    Musculoskeletal: Negative.    Skin: Negative.    Neurological: Negative.    Hematological: Negative.    Psychiatric/Behavioral: Negative.          Current Medications[1]  Surgical History[2]  Family History[3]   Social History[4]     Objective     Visit Vitals  /89 (BP Location: Left arm, Patient Position: Sitting, BP Cuff Size: Adult)   Pulse 100   Temp 36.5 °C (97.7 °F) (Temporal)   Resp 14   Ht 1.524 m (5')   Wt 72.1 kg (159 lb)   SpO2 100%   BMI 31.05 kg/m²   Smoking Status Never   BSA 1.75 m²        Physical Exam  Constitutional:       Appearance: Normal appearance.   HENT:      Head:  Normocephalic and atraumatic.   Eyes:      Extraocular Movements: Extraocular movements intact.      Pupils: Pupils are equal, round, and reactive to light.   Cardiovascular:      Rate and Rhythm: Normal rate and regular rhythm.   Pulmonary:      Effort: Pulmonary effort is normal.      Breath sounds: Normal breath sounds.   Abdominal:      General: Abdomen is flat. Bowel sounds are normal.      Palpations: Abdomen is soft.   Musculoskeletal:         General: Normal range of motion.   Skin:     General: Skin is warm and dry.      Capillary Refill: Capillary refill takes less than 2 seconds.   Neurological:      General: No focal deficit present.      Mental Status: She is alert and oriented to person, place, and time.   Psychiatric:         Mood and Affect: Mood normal.         Behavior: Behavior normal.           Assessment/Plan   Problem List Items Addressed This Visit       Allergic rhinitis    Relevant Medications    cetirizine (ZyrTEC) 10 mg tablet    High triglycerides    Triglycerdies elevated at 293 on labs from 12/2024  Has been on omega 3 fatty acid 2g BID for 2 years without much improvement  Given her age would be hesitant to put her on statin until all lifestyle measures exhausted    Repeat lipid panel in 2-3 months  Encouraged to get more exercise to see if this helps         Relevant Medications    omega-3 acid ethyl esters (Lovaza) 1 gram capsule    Vitamin D deficiency    Insomnia    Relevant Medications    traZODone (Desyrel) 50 mg tablet    Healthcare maintenance - Primary    - Healthy diet, good quality and duration of sleep, healthy water intake, regular exercise and healthy weight discussed  Vaccines   Flu: Recommended annually  Tdap: Current as of 2/2021  - GYN/PAP: Last PAP 1/2022, PAP due. Referred today  -Following with dentistry and optometry regularly  -No symptoms of depression/concerns for anxiety  -Feeling safe at home  -Skin cancer prevention           Other Visit Diagnoses          Bilateral thumb pain        Relevant Medications    diclofenac sodium (Voltaren) 1 % gel      Generalized anxiety disorder        Relevant Medications    venlafaxine XR (Effexor-XR) 150 mg 24 hr capsule      Well woman exam        Relevant Orders    Referral to Gynecology            All pertinent lab work and results were reviewed with patient.     Follow up with me in 1 year     MELI Rico-CNS         [1]   Current Outpatient Medications   Medication Sig Dispense Refill    adapalene (Differin) 0.3 % gel Apply topically once daily at bedtime.      busPIRone (Buspar) 10 mg tablet TAKE 1 TABLET(10 MG) BY MOUTH TWICE DAILY AS NEEDED FOR ANXIETY 135 tablet 3    drospirenone-ethinyl estradioL (Karo, Gianvi) 3-0.02 mg tablet Take 1 tablet by mouth once daily.      ergocalciferol (Vitamin D-2) 1.25 MG (55020 UT) capsule Take 1 capsule (1,250 mcg) by mouth 1 (one) time per week. 24 capsule 1    fluorouracil (Efudex) 5 % cream APPLY TOPICALLY TO THE AFFECTED AREA TWICE DAILY THEN COVER WITH A BANDAID      ketoconazole (NIZOral) 2 % shampoo APPLY TO SCALP IN SHOWER LET SIT FOR 5 MINUTES THEN RINSE ONCE A DAY      montelukast (Singulair) 10 mg tablet Take 1 tablet (10 mg) by mouth once daily. 90 tablet 3    spironolactone (Aldactone) 50 mg tablet Take 1 tablet (50 mg) by mouth once daily.      tazarotene (Arazlo) 0.045 % lotion APPLY TO FACE 2 TO 3 NIGHTS PER WEEK INCREASING TO NIGHTLY AS TOLERATED      tazarotene (Avage, Tazorac) 0.05 % gel APPLY TOPICALLY TO FACE EVERY NIGHT AS TOLERATED      cetirizine (ZyrTEC) 10 mg tablet Take 1 tablet (10 mg) by mouth once daily at bedtime. 90 tablet 3    diclofenac sodium (Voltaren) 1 % gel Apply 4.5 inches (4 g) topically 4 times a day. 100 g 1    omega-3 acid ethyl esters (Lovaza) 1 gram capsule Take 2 capsules (2 g) by mouth 2 times a day. 360 capsule 3    traZODone (Desyrel) 50 mg tablet Take 1 tablet (50 mg) by mouth as needed at bedtime for sleep. 30 tablet 11     venlafaxine XR (Effexor-XR) 150 mg 24 hr capsule Take 1 capsule (150 mg) by mouth once daily. Do not crush or chew. 90 capsule 3     No current facility-administered medications for this visit.   [2]   Past Surgical History:  Procedure Laterality Date    ADENOIDECTOMY      OTHER SURGICAL HISTORY  07/31/2020    Tonsillectomy with adenoidectomy    OTHER SURGICAL HISTORY  07/31/2020    Foot surgery    OTHER SURGICAL HISTORY  07/31/2020    Ear surgery    TONSILLECTOMY      WISDOM TOOTH EXTRACTION     [3]   Family History  Problem Relation Name Age of Onset    Diabetes Mother Lisbeth     Other (abnormal pap) Mother Lisbeth     Diabetes Maternal Grandmother Sandra     Hypertension Maternal Grandmother Sandra     Depression Maternal Grandmother Sandra     Hyperlipidemia Maternal Grandmother Sandra     Other (vertigo) Maternal Grandmother Sandra     Dementia Other      Psoriasis Other      Hyperlipidemia Other     [4]   Social History  Tobacco Use    Smoking status: Never     Passive exposure: Never    Smokeless tobacco: Never   Vaping Use    Vaping status: Never Used   Substance Use Topics    Alcohol use: Yes     Comment: once in a blue moon    Drug use: Yes     Types: Marijuana

## 2025-05-16 NOTE — PATIENT INSTRUCTIONS
Thank you for coming to see me today.  If you have any questions or concerns following our visit, please contact the office.  Phone: [phone]    Follow up with me in 1 year or sooner as needed    1)  Increase water intake to 64 oz per day (4-6 glasses per day)     2) START diclofenac gel, apply 4 times daily to bilateral thumbs    3) I am referring you to OB/GYN for well woman exam. Please call [phone] (Alberto) or [phone] (Fredrick) to make an appointment    4) Get fasting labwork in 2-3 months to check triglycerides.  The lab is down the [address] from our office. You can schedule an appointment online by [email or web address]

## 2025-05-16 NOTE — ASSESSMENT & PLAN NOTE
- Healthy diet, good quality and duration of sleep, healthy water intake, regular exercise and healthy weight discussed  Vaccines   Flu: Recommended annually  Tdap: Current as of 2/2021  - GYN/PAP: Last PAP 1/2022, PAP due. Referred today  -Following with dentistry and optometry regularly  -No symptoms of depression/concerns for anxiety  -Feeling safe at home  -Skin cancer prevention

## 2025-08-20 LAB
CHOLEST SERPL-MCNC: 188 MG/DL
CHOLEST/HDLC SERPL: 3.1 (CALC)
HDLC SERPL-MCNC: 61 MG/DL
LDLC SERPL CALC-MCNC: 96 MG/DL (CALC)
NONHDLC SERPL-MCNC: 127 MG/DL (CALC)
TRIGL SERPL-MCNC: 211 MG/DL

## 2026-02-05 ENCOUNTER — APPOINTMENT (OUTPATIENT)
Dept: OBSTETRICS AND GYNECOLOGY | Facility: CLINIC | Age: 26
End: 2026-02-05
Payer: MEDICAID

## 2026-05-18 ENCOUNTER — APPOINTMENT (OUTPATIENT)
Dept: PRIMARY CARE | Facility: CLINIC | Age: 26
End: 2026-05-18
Payer: MEDICAID